# Patient Record
Sex: FEMALE | Race: WHITE | NOT HISPANIC OR LATINO | Employment: UNEMPLOYED | URBAN - METROPOLITAN AREA
[De-identification: names, ages, dates, MRNs, and addresses within clinical notes are randomized per-mention and may not be internally consistent; named-entity substitution may affect disease eponyms.]

---

## 2017-01-19 ENCOUNTER — OFFICE VISIT (OUTPATIENT)
Dept: UROLOGY | Facility: CLINIC | Age: 47
End: 2017-01-19
Payer: COMMERCIAL

## 2017-01-19 DIAGNOSIS — N39.46 MIXED INCONTINENCE: Primary | ICD-10-CM

## 2017-01-19 DIAGNOSIS — N32.81 OAB (OVERACTIVE BLADDER): ICD-10-CM

## 2017-01-19 LAB
ALBUMIN UR-MCNC: NEGATIVE MG/DL
APPEARANCE UR: CLEAR
BILIRUB UR QL STRIP: NEGATIVE
COLOR UR AUTO: YELLOW
GLUCOSE UR STRIP-MCNC: NEGATIVE MG/DL
HGB UR QL STRIP: NEGATIVE
KETONES UR STRIP-MCNC: NEGATIVE MG/DL
LEUKOCYTE ESTERASE UR QL STRIP: NEGATIVE
NITRATE UR QL: NEGATIVE
PH UR STRIP: 8.5 PH (ref 5–7)
SP GR UR STRIP: 1.02 (ref 1–1.03)
URN SPEC COLLECT METH UR: ABNORMAL
UROBILINOGEN UR STRIP-ACNC: 0.2 EU/DL (ref 0.2–1)

## 2017-01-19 PROCEDURE — 99024 POSTOP FOLLOW-UP VISIT: CPT | Performed by: UROLOGY

## 2017-01-19 PROCEDURE — 81003 URINALYSIS AUTO W/O SCOPE: CPT | Mod: QW | Performed by: UROLOGY

## 2017-01-19 PROCEDURE — 52287 CYSTOSCOPY CHEMODENERVATION: CPT | Mod: 79 | Performed by: UROLOGY

## 2017-01-19 NOTE — PROGRESS NOTES
"F/u mixed urinary incontinence, s/p Altis sling 12/12/16    Pt reports resolution of NORTH but still with urge and urge incont, fawn when she sits up in bed or \"moves any way at all.\"    Denies dysuria, gross hematuria, frequency. Noc x 1-3. Wears thick pad day and nite.      PE: Excellent support, non-tender    PVR: nil    UA:    Results for orders placed or performed in visit on 01/19/17   UA without Microscopic   Result Value Ref Range    Color Urine Yellow     Appearance Urine Clear     Glucose Urine Negative NEG mg/dL    Bilirubin Urine Negative NEG    Ketones Urine Negative NEG mg/dL    Specific Gravity Urine 1.020 1.003 - 1.035    Blood Urine Negative NEG    pH Urine 8.5 (H) 5.0 - 7.0 pH    Protein Albumin Urine Negative NEG mg/dL    Urobilinogen Urine 0.2 0.2 - 1.0 EU/dL    Nitrite Urine Negative NEG    Leukocyte Esterase Urine Negative NEG    Source Catheterized Urine      Offered pt Botox as previously discussed. Again discussed rationale, mech of action, risks, complications, recovery, results, retention risk, need for repeat therapy,etc; informed consent obtained. Urojet for 10 minutes. Then Botox A 100 units (lot [id number], exp 8/19) into 3 sites in posterior wall; excellent blebs and hemostasis.        IMP:  1. Resolution of NORTH with sling  2. Persistant OAB wet; Botox #1 with 100 units      PLAN:  1. Discussed situation with patient in detail.  2. RTC 2 wks to review progress  3. Precautions given in detail  4. Copy H Juma          "

## 2017-02-02 ENCOUNTER — OFFICE VISIT (OUTPATIENT)
Dept: UROLOGY | Facility: CLINIC | Age: 47
End: 2017-02-02
Payer: COMMERCIAL

## 2017-02-02 DIAGNOSIS — N39.46 MIXED INCONTINENCE URGE AND STRESS (MALE)(FEMALE): Primary | ICD-10-CM

## 2017-02-02 DIAGNOSIS — R30.0 DYSURIA: ICD-10-CM

## 2017-02-02 LAB
ALBUMIN UR-MCNC: ABNORMAL MG/DL
APPEARANCE UR: CLEAR
BILIRUB UR QL STRIP: NEGATIVE
COLOR UR AUTO: YELLOW
GLUCOSE UR STRIP-MCNC: NEGATIVE MG/DL
HGB UR QL STRIP: ABNORMAL
KETONES UR STRIP-MCNC: NEGATIVE MG/DL
LEUKOCYTE ESTERASE UR QL STRIP: ABNORMAL
NITRATE UR QL: POSITIVE
PH UR STRIP: 7 PH (ref 5–7)
SP GR UR STRIP: 1.02 (ref 1–1.03)
URN SPEC COLLECT METH UR: ABNORMAL
UROBILINOGEN UR STRIP-ACNC: 1 EU/DL (ref 0.2–1)

## 2017-02-02 PROCEDURE — 99024 POSTOP FOLLOW-UP VISIT: CPT | Performed by: UROLOGY

## 2017-02-02 PROCEDURE — 87088 URINE BACTERIA CULTURE: CPT | Performed by: UROLOGY

## 2017-02-02 PROCEDURE — 87186 SC STD MICRODIL/AGAR DIL: CPT | Performed by: UROLOGY

## 2017-02-02 PROCEDURE — 81003 URINALYSIS AUTO W/O SCOPE: CPT | Mod: QW | Performed by: UROLOGY

## 2017-02-02 PROCEDURE — 87086 URINE CULTURE/COLONY COUNT: CPT | Performed by: UROLOGY

## 2017-02-02 RX ORDER — SULFAMETHOXAZOLE/TRIMETHOPRIM 800-160 MG
1 TABLET ORAL 2 TIMES DAILY
Qty: 6 TABLET | Refills: 0 | Status: SHIPPED | OUTPATIENT
Start: 2017-02-02 | End: 2017-02-05

## 2017-02-02 NOTE — PROGRESS NOTES
F/u mixed incont, s/p Altis 12/16, s/p Botox #1 with 100 units 1/19/17    Control (with marked reduction in urgency) began within a couple of hours of injection. Now no leak at nite, even with movement, and no leak en route to the restroom first thing in the morning.    For the last couple of days has had some trace dysuria and a bit more urgency.      PVR: 19    Results for orders placed or performed in visit on 02/02/17   UA without Microscopic   Result Value Ref Range    Color Urine Yellow     Appearance Urine Clear     Glucose Urine Negative NEG mg/dL    Bilirubin Urine Negative NEG    Ketones Urine Negative NEG mg/dL    Specific Gravity Urine 1.020 1.003 - 1.035    Blood Urine Trace (A) NEG    pH Urine 7.0 5.0 - 7.0 pH    Protein Albumin Urine Trace (A) NEG mg/dL    Urobilinogen Urine 1.0 0.2 - 1.0 EU/dL    Nitrite Urine Positive (A) NEG    Leukocyte Esterase Urine Moderate (A) NEG    Source Midstream Urine        IMP:  1. Mixed UI, improved after first Botox  2. UTI      PLAN:  1. Discussed situation with patient in detail.  2. UC; emperic Bactrim  3. RTC 2.5 mos; review progress; may want to add additional Botox at that point; discussed in detail  4. 15 minutes spent with patient, more than 50% spent in counseling and coordination of care for incont and UTI.  5. Copy H Dennison

## 2017-02-05 LAB
BACTERIA SPEC CULT: ABNORMAL
Lab: ABNORMAL
MICRO REPORT STATUS: ABNORMAL
MICROORGANISM SPEC CULT: ABNORMAL
MICROORGANISM SPEC CULT: ABNORMAL
SPECIMEN SOURCE: ABNORMAL

## 2017-02-06 ENCOUNTER — TELEPHONE (OUTPATIENT)
Dept: UROLOGY | Facility: CLINIC | Age: 47
End: 2017-02-06

## 2017-02-06 NOTE — TELEPHONE ENCOUNTER
Called pt and informed her of positive UC> 50,000 to 100,000 E-coli and Strain 2 E-coli > 10-50,000, both sensitive to Bactrim .Pt stated she felt better. Pt has f/u appt 5/17 or sooner if needed.

## 2017-02-16 ENCOUNTER — OFFICE VISIT (OUTPATIENT)
Dept: UROLOGY | Facility: CLINIC | Age: 47
End: 2017-02-16
Payer: COMMERCIAL

## 2017-02-16 VITALS — SYSTOLIC BLOOD PRESSURE: 110 MMHG | DIASTOLIC BLOOD PRESSURE: 76 MMHG

## 2017-02-16 DIAGNOSIS — R30.0 DYSURIA: Primary | ICD-10-CM

## 2017-02-16 LAB
ALBUMIN UR-MCNC: NEGATIVE MG/DL
APPEARANCE UR: CLEAR
BILIRUB UR QL STRIP: NEGATIVE
COLOR UR AUTO: YELLOW
GLUCOSE UR STRIP-MCNC: NEGATIVE MG/DL
HGB UR QL STRIP: ABNORMAL
KETONES UR STRIP-MCNC: NEGATIVE MG/DL
LEUKOCYTE ESTERASE UR QL STRIP: ABNORMAL
NITRATE UR QL: NEGATIVE
PH UR STRIP: 7 PH (ref 5–7)
SP GR UR STRIP: 1.01 (ref 1–1.03)
URN SPEC COLLECT METH UR: ABNORMAL
UROBILINOGEN UR STRIP-ACNC: 0.2 EU/DL (ref 0.2–1)

## 2017-02-16 PROCEDURE — 87086 URINE CULTURE/COLONY COUNT: CPT | Performed by: UROLOGY

## 2017-02-16 PROCEDURE — 99024 POSTOP FOLLOW-UP VISIT: CPT | Performed by: UROLOGY

## 2017-02-16 PROCEDURE — 87088 URINE BACTERIA CULTURE: CPT | Performed by: UROLOGY

## 2017-02-16 PROCEDURE — 87186 SC STD MICRODIL/AGAR DIL: CPT | Performed by: UROLOGY

## 2017-02-16 PROCEDURE — 81003 URINALYSIS AUTO W/O SCOPE: CPT | Mod: QW | Performed by: UROLOGY

## 2017-02-16 NOTE — PROGRESS NOTES
"Here for possible UTI      Underwent last Botox on 1/19/17    Seen 2/2/17, found to have E Coli UTI with 2 strains.    Now reports 2 d of \"some burning;\" seems to be better today; \"I almost called to cancel the appointment.\"    BM's are fine. Otherwise feels well.      Results for orders placed or performed in visit on 02/16/17   UA without Microscopic   Result Value Ref Range    Color Urine Yellow     Appearance Urine Clear     Glucose Urine Negative NEG mg/dL    Bilirubin Urine Negative NEG    Ketones Urine Negative NEG mg/dL    Specific Gravity Urine 1.015 1.003 - 1.035    Blood Urine Moderate (A) NEG    pH Urine 7.0 5.0 - 7.0 pH    Protein Albumin Urine Negative NEG mg/dL    Urobilinogen Urine 0.2 0.2 - 1.0 EU/dL    Nitrite Urine Negative NEG    Leukocyte Esterase Urine Moderate (A) NEG    Source Midstream Urine        IMP:  1. Unlikely to be UTI      PLAN:  1. UC; no emperic rx  2. RTC as scheduled  3. 15 minutes spent with patient, more than 50% spent in counseling and coordination of care for OAB/UTI.  4. Copy H Dennison  "

## 2017-02-16 NOTE — MR AVS SNAPSHOT
"              After Visit Summary   2/16/2017    Marsha Charles    MRN: [id number]           Patient Information     Date Of Birth          1970        Visit Information        Provider Department      2/16/2017 1:15 PM Luis Hanson MD [facility]        Today's Diagnoses     Dysuria    -  1       Follow-ups after your visit        Your next 10 appointments already scheduled     May 01, 2017 11:30 AM CDT   Return Visit with Luis Hanson MD   [facility] ([facility])    [address]  [facility] [id number] [phone]              Who to contact     If you have questions or need follow up information about today's clinic visit or your schedule please contact [facility] directly at [phone].  Normal or non-critical lab and imaging results will be communicated to you by MyChart, letter or phone within 4 business days after the clinic has received the results. If you do not hear from us within 7 days, please contact the clinic through MyChart or phone. If you have a critical or abnormal lab result, we will notify you by phone as soon as possible.  Submit refill requests through [employer] or call your pharmacy and they will forward the refill request to us. Please allow 3 business days for your refill to be completed.          Additional Information About Your Visit        MyChart Information     [employer] lets you send messages to your doctor, view your test results, renew your prescriptions, schedule appointments and more. To sign up, go to [email or web address] . Click on \"Log in\" on the left side of the screen, which will take you to the Welcome page. Then click on \"Sign up Now\" on the right side of the page.     You will be asked to enter the access code listed below, as well as some personal information. Please follow the " directions to create your username and password.     Your access code is: [email or web address]  Expires: 2017  1:30 PM     Your access code will  in 90 days. If you need help or a new code, please call your [place] clinic or [phone].        Care EveryWhere ID     This is your Care EveryWhere ID. This could be used by other organizations to access your [place] medical records  [id number]         Blood Pressure from Last 3 Encounters:   17 110/76   16 123/72    Weight from Last 3 Encounters:   16 190 lb (86.2 kg)              We Performed the Following     UA without Microscopic        Primary Care Provider Office Phone # Fax #    Halina Dennison [phone]       [employer] [place] [address] [facility] [address]        Thank you!     Thank you for choosing [facility] FOR [facility]OL [facility]  for your care. Our goal is always to provide you with excellent care. Hearing back from our patients is one way we can continue to improve our services. Please take a few minutes to complete the written survey that you may receive in the mail after your visit with us. Thank you!             Your Updated Medication List - Protect others around you: Learn how to safely use, store and throw away your medicines at www.disposemymeds.org.          This list is accurate as of: 17  1:30 PM.  Always use your most recent med list.                   Brand Name Dispense Instructions for use    albuterol 108 (90 BASE) MCG/ACT Inhaler    PROAIR HFA/PROVENTIL HFA/VENTOLIN HFA     Inhale 2 puffs into the lungs every 4 hours as needed for shortness of breath / dyspnea or wheezing       ATORVASTATIN CALCIUM PO      Take 40 mg by mouth daily       budesonide-formoterol 80-4.5 MCG/ACT Inhaler    SYMBICORT     Inhale 2 puffs into the lungs daily as needed       CHANTIX PO      Take 1 mg by mouth       GABAPENTIN PO      Take 600 mg by mouth 3 times daily       METFORMIN HCL PO       Take 1,000 mg by mouth       SUDAFED PO      Take 30 mg by mouth every 4 hours as needed for congestion       TYLENOL PO      Take 1,000 mg by mouth every 6 hours as needed for mild pain or fever

## 2017-02-18 LAB
BACTERIA SPEC CULT: ABNORMAL
Lab: ABNORMAL
MICRO REPORT STATUS: ABNORMAL
MICROORGANISM SPEC CULT: ABNORMAL
SPECIMEN SOURCE: ABNORMAL

## 2017-02-19 ENCOUNTER — TELEPHONE (OUTPATIENT)
Dept: UROLOGY | Facility: CLINIC | Age: 47
End: 2017-02-19

## 2017-02-19 DIAGNOSIS — R30.0 DYSURIA: Primary | ICD-10-CM

## 2017-02-19 RX ORDER — SULFAMETHOXAZOLE/TRIMETHOPRIM 800-160 MG
1 TABLET ORAL 2 TIMES DAILY
Qty: 6 TABLET | Refills: 0 | Status: SHIPPED | OUTPATIENT
Start: 2017-02-19 | End: 2017-02-22

## 2017-03-07 ENCOUNTER — TELEPHONE (OUTPATIENT)
Dept: UROLOGY | Facility: CLINIC | Age: 47
End: 2017-03-07

## 2017-03-07 NOTE — TELEPHONE ENCOUNTER
Pt called and thinks she has another botox, same symptoms as previous visits, burning, frequency.  Pt is now concerned because has has has to positive UC's since botox was done 1/19/17, she is wondering if she should've been given more antibiotics that would last more than 3 days.  Pt upset because she was not given a antibiotic after botox injection in January.  Please advise as to what pt should do at this point? Should she come back again for UC? Will you give her more antibiotics?

## 2017-03-08 ENCOUNTER — OFFICE VISIT (OUTPATIENT)
Dept: UROLOGY | Facility: CLINIC | Age: 47
End: 2017-03-08
Payer: COMMERCIAL

## 2017-03-08 DIAGNOSIS — R30.0 DYSURIA: Primary | ICD-10-CM

## 2017-03-08 LAB
ALBUMIN UR-MCNC: 30 MG/DL
APPEARANCE UR: ABNORMAL
BILIRUB UR QL STRIP: NEGATIVE
COLOR UR AUTO: YELLOW
GLUCOSE UR STRIP-MCNC: NEGATIVE MG/DL
HGB UR QL STRIP: ABNORMAL
KETONES UR STRIP-MCNC: ABNORMAL MG/DL
LEUKOCYTE ESTERASE UR QL STRIP: ABNORMAL
NITRATE UR QL: POSITIVE
PH UR STRIP: 7 PH (ref 5–7)
SP GR UR STRIP: 1.01 (ref 1–1.03)
URN SPEC COLLECT METH UR: ABNORMAL
UROBILINOGEN UR STRIP-ACNC: 1 EU/DL (ref 0.2–1)

## 2017-03-08 PROCEDURE — 99024 POSTOP FOLLOW-UP VISIT: CPT | Performed by: UROLOGY

## 2017-03-08 PROCEDURE — 87086 URINE CULTURE/COLONY COUNT: CPT | Performed by: UROLOGY

## 2017-03-08 PROCEDURE — 87186 SC STD MICRODIL/AGAR DIL: CPT | Performed by: UROLOGY

## 2017-03-08 PROCEDURE — 81003 URINALYSIS AUTO W/O SCOPE: CPT | Mod: QW | Performed by: UROLOGY

## 2017-03-08 PROCEDURE — 87088 URINE BACTERIA CULTURE: CPT | Performed by: UROLOGY

## 2017-03-08 RX ORDER — NITROFURANTOIN 25; 75 MG/1; MG/1
100 CAPSULE ORAL 2 TIMES DAILY
Qty: 10 CAPSULE | Refills: 0 | Status: SHIPPED | OUTPATIENT
Start: 2017-03-08 | End: 2017-03-13

## 2017-03-08 NOTE — PROGRESS NOTES
"Here for f/u UTI    S/p Altis sling 12/12/16 and Botox 1/19/17    Now with intermittent dysuria, slight today. Nocturia x 4 last nite but only x 1 the prior 2 nites.     Wearing a light pad, essentially dry during the days but wet last nite.    Otherwise feels well.    Has \"borderline\" DM, on Metformin, has not checked her sugar in \"a long time.\"      Reviewed urines:  12/12/16 Sling  1/19/17 Botox; UA neg  2/2/17  UC: E coli  2/16/17 UC: E coli    Today:  Results for orders placed or performed in visit on 03/08/17   UA without Microscopic   Result Value Ref Range    Color Urine Yellow     Appearance Urine Cloudy     Glucose Urine Negative NEG mg/dL    Bilirubin Urine Negative NEG    Ketones Urine Trace (A) NEG mg/dL    Specific Gravity Urine 1.015 1.003 - 1.035    Blood Urine Trace (A) NEG    pH Urine 7.0 5.0 - 7.0 pH    Protein Albumin Urine 30 (A) NEG mg/dL    Urobilinogen Urine 1.0 0.2 - 1.0 EU/dL    Nitrite Urine Positive (A) NEG    Leukocyte Esterase Urine Moderate (A) NEG    Source Midstream Urine        IMP:  1. UTI, recurrent vs persistent  2. DM with questionable control      PLAN:  1. Discussed situation with patient in detail.  2. UC; emperic rx with Macrobid (per prior sensitivities)  3. Stressed importance of sugar control on infection control; pt will check her sugar today  4. RTC 1 mo to review progress, perform test of cure  5. 15 minutes spent with patient, more than 50% spent in counseling and coordination of care for UTI.  6. Copy H Juma  "

## 2017-03-08 NOTE — MR AVS SNAPSHOT
"              After Visit Summary   3/8/2017    Marsha Charles    MRN: [id number]           Patient Information     Date Of Birth          1970        Visit Information        Provider Department      3/8/2017 9:00 AM Luis Hanson MD [facility]        Today's Diagnoses     Dysuria    -  1       Follow-ups after your visit        Follow-up notes from your care team     Return in about 1 month (around 4/8/2017).      Your next 10 appointments already scheduled     May 01, 2017 11:30 AM CDT   Return Visit with Luis Hanson MD   [facility] ([facility])    [address] [place] [address] [place] [address] [phone]              Who to contact     If you have questions or need follow up information about today's clinic visit or your schedule please contact [facility] directly at [phone].  Normal or non-critical lab and imaging results will be communicated to you by "[email or web address]hart, letter or phone within 4 business days after the clinic has received the results. If you do not hear from us within 7 days, please contact the clinic through "[email or web address]hart or phone. If you have a critical or abnormal lab result, we will notify you by phone as soon as possible.  Submit refill requests through [employer] or call your pharmacy and they will forward the refill request to us. Please allow 3 business days for your refill to be completed.          Additional Information About Your Visit        MyChart Information     [employer] lets you send messages to your doctor, view your test results, renew your prescriptions, schedule appointments and more. To sign up, go to [email or web address] . Click on \"Log in\" on the left side of the screen, which will take you to the Welcome page. Then click on \"Sign up Now\" on the right side of the page.     You will be asked to enter " the access code listed below, as well as some personal information. Please follow the directions to create your username and password.     Your access code is: [email or web address]  Expires: 2017  1:30 PM     Your access code will  in 90 days. If you need help or a new code, please call your [place] clinic or [phone].        Care EveryWhere ID     This is your Care EveryWhere ID. This could be used by other organizations to access your [place] medical records  [id number]         Blood Pressure from Last 3 Encounters:   17 110/76   16 123/72    Weight from Last 3 Encounters:   16 190 lb (86.2 kg)              We Performed the Following     UA without Microscopic     Urine Culture Aerobic Bacterial          Today's Medication Changes          These changes are accurate as of: 3/8/17  9:55 AM.  If you have any questions, ask your nurse or doctor.               Start taking these medicines.        Dose/Directions    nitrofurantoin (macrocrystal-monohydrate) 100 MG capsule   Commonly known as:  MACROBID   Used for:  Dysuria        Dose:  100 mg   Take 1 capsule (100 mg) by mouth 2 times daily for 10 doses   Quantity:  10 capsule   Refills:  0            Where to get your medicines      These medications were sent to [facility] [address] [place] [address] [id number] [place], [place] [id number]     Phone:  [phone]     nitrofurantoin (macrocrystal-monohydrate) 100 MG capsule                Primary Care Provider Office Phone # Fax #    Halina Dennison [phone]       [facility] [address] [facility] [address]        Thank you!     Thank you for choosing [facility]  for your care. Our goal is always to provide you with excellent care. Hearing back from our patients is one way we can continue to improve our services. Please take a few minutes to complete the written survey that you may receive in the mail  after your visit with us. Thank you!             Your Updated Medication List - Protect others around you: Learn how to safely use, store and throw away your medicines at www.disposemymeds.org.          This list is accurate as of: 3/8/17  9:55 AM.  Always use your most recent med list.                   Brand Name Dispense Instructions for use    albuterol 108 (90 BASE) MCG/ACT Inhaler    PROAIR HFA/PROVENTIL HFA/VENTOLIN HFA     Inhale 2 puffs into the lungs every 4 hours as needed for shortness of breath / dyspnea or wheezing       ATORVASTATIN CALCIUM PO      Take 40 mg by mouth daily       budesonide-formoterol 80-4.5 MCG/ACT Inhaler    SYMBICORT     Inhale 2 puffs into the lungs daily as needed       CHANTIX PO      Take 1 mg by mouth       GABAPENTIN PO      Take 600 mg by mouth 3 times daily       METFORMIN HCL PO      Take 1,000 mg by mouth       nitrofurantoin (macrocrystal-monohydrate) 100 MG capsule    MACROBID    10 capsule    Take 1 capsule (100 mg) by mouth 2 times daily for 10 doses       SUDAFED PO      Take 30 mg by mouth every 4 hours as needed for congestion       TYLENOL PO      Take 1,000 mg by mouth every 6 hours as needed for mild pain or fever

## 2017-03-13 ENCOUNTER — TELEPHONE (OUTPATIENT)
Dept: UROLOGY | Facility: CLINIC | Age: 47
End: 2017-03-13

## 2017-03-13 NOTE — TELEPHONE ENCOUNTER
Called pt and informed her of positive UC>,000 colonies E-Coli and that she had been put on the correct mediation, Macrobid. Pt RTC 4/5/17 for CHRISTINA.

## 2017-03-15 ENCOUNTER — OFFICE VISIT (OUTPATIENT)
Dept: UROLOGY | Facility: CLINIC | Age: 47
End: 2017-03-15
Payer: COMMERCIAL

## 2017-03-15 DIAGNOSIS — R30.0 DYSURIA: Primary | ICD-10-CM

## 2017-03-15 LAB
ALBUMIN UR-MCNC: NEGATIVE MG/DL
APPEARANCE UR: CLEAR
BILIRUB UR QL STRIP: NEGATIVE
COLOR UR AUTO: YELLOW
GLUCOSE UR STRIP-MCNC: NEGATIVE MG/DL
HGB UR QL STRIP: NEGATIVE
KETONES UR STRIP-MCNC: NEGATIVE MG/DL
LEUKOCYTE ESTERASE UR QL STRIP: NEGATIVE
NITRATE UR QL: NEGATIVE
PH UR STRIP: 8.5 PH (ref 5–7)
SP GR UR STRIP: 1.01 (ref 1–1.03)
URN SPEC COLLECT METH UR: ABNORMAL
UROBILINOGEN UR STRIP-ACNC: 1 EU/DL (ref 0.2–1)

## 2017-03-15 PROCEDURE — 87086 URINE CULTURE/COLONY COUNT: CPT | Performed by: UROLOGY

## 2017-03-15 PROCEDURE — 99213 OFFICE O/P EST LOW 20 MIN: CPT | Performed by: UROLOGY

## 2017-03-15 PROCEDURE — 81003 URINALYSIS AUTO W/O SCOPE: CPT | Mod: QW | Performed by: UROLOGY

## 2017-03-15 NOTE — PROGRESS NOTES
Here for possible UTI.    S/p Altis sling 12/16 and Botox #1 on 1/17    Took all of her Macrobid.    Yesterday noted some urgency, perhaps some trace dysuria.    Sugars are better, around 150.        Results for orders placed or performed in visit on 03/15/17   UA without Microscopic   Result Value Ref Range    Color Urine Yellow     Appearance Urine Clear     Glucose Urine Negative NEG mg/dL    Bilirubin Urine Negative NEG    Ketones Urine Negative NEG mg/dL    Specific Gravity Urine 1.015 1.003 - 1.035    Blood Urine Negative NEG    pH Urine 8.5 (H) 5.0 - 7.0 pH    Protein Albumin Urine Negative NEG mg/dL    Urobilinogen Urine 1.0 0.2 - 1.0 EU/dL    Nitrite Urine Negative NEG    Leukocyte Esterase Urine Negative NEG    Source Midstream Urine        IMP:  1. Urinary urgency  2. Recent persistent UTI, apparently resolved  3. DM, under better control      PLAN:  1. Discussed situation with patient in detail.  2. UC; no emperic abx  3. Continue to watch sugars  4. RTC in early April as scheduled (or sooner prn)  5. 15 minutes spent with patient, more than 50% spent in counseling and coordination of care for urgency/UTI.  6. Copy H Juma

## 2017-03-15 NOTE — MR AVS SNAPSHOT
"              After Visit Summary   3/15/2017    Marsha Charles    MRN: [id number]           Patient Information     Date Of Birth          1970        Visit Information        Provider Department      3/15/2017 10:30 AM Luis Hanson MD [facility]        Today's Diagnoses     Dysuria    -  1       Follow-ups after your visit        Your next 10 appointments already scheduled     Apr 05, 2017  8:45 AM CDT   Return Visit with Luis Hanson MD   [facility] ([facility] [place])    [address]  [facility] [id number] [phone]              Who to contact     If you have questions or need follow up information about today's clinic visit or your schedule please contact [facility] directly at [phone].  Normal or non-critical lab and imaging results will be communicated to you by MyChart, letter or phone within 4 business days after the clinic has received the results. If you do not hear from us within 7 days, please contact the clinic through MyChart or phone. If you have a critical or abnormal lab result, we will notify you by phone as soon as possible.  Submit refill requests through [employer] or call your pharmacy and they will forward the refill request to us. Please allow 3 business days for your refill to be completed.          Additional Information About Your Visit        MyChart Information     [employer] lets you send messages to your doctor, view your test results, renew your prescriptions, schedule appointments and more. To sign up, go to [email or web address] . Click on \"Log in\" on the left side of the screen, which will take you to the Welcome page. Then click on \"Sign up Now\" on the right side of the page.     You will be asked to enter the access code listed below, as well as some personal information. Please follow " the directions to create your username and password.     Your access code is: [email or web address]  Expires: 2017  2:30 PM     Your access code will  in 90 days. If you need help or a new code, please call your [place] clinic or [phone].        Care EveryWhere ID     This is your Care EveryWhere ID. This could be used by other organizations to access your [place] medical records  [id number]         Blood Pressure from Last 3 Encounters:   17 110/76   16 123/72    Weight from Last 3 Encounters:   16 190 lb (86.2 kg)              We Performed the Following     UA without Microscopic     Urine Culture Aerobic Bacterial        Primary Care Provider Office Phone # Fax #    Halina Dennison [phone]       [employer] [address] [facility] [address]        Thank you!     Thank you for choosing [facility]  for your care. Our goal is always to provide you with excellent care. Hearing back from our patients is one way we can continue to improve our services. Please take a few minutes to complete the written survey that you may receive in the mail after your visit with us. Thank you!             Your Updated Medication List - Protect others around you: Learn how to safely use, store and throw away your medicines at www.disposemymeds.org.          This list is accurate as of: 3/15/17 10:52 AM.  Always use your most recent med list.                   Brand Name Dispense Instructions for use    albuterol 108 (90 BASE) MCG/ACT Inhaler    PROAIR HFA/PROVENTIL HFA/VENTOLIN HFA     Inhale 2 puffs into the lungs every 4 hours as needed for shortness of breath / dyspnea or wheezing       ATORVASTATIN CALCIUM PO      Take 40 mg by mouth daily       budesonide-formoterol 80-4.5 MCG/ACT Inhaler    SYMBICORT     Inhale 2 puffs into the lungs daily as needed       CHANTIX PO      Take 1 mg by mouth       GABAPENTIN PO      Take 600 mg by mouth  3 times daily       METFORMIN HCL PO      Take 1,000 mg by mouth       SUDAFED PO      Take 30 mg by mouth every 4 hours as needed for congestion       TYLENOL PO      Take 1,000 mg by mouth every 6 hours as needed for mild pain or fever

## 2017-03-16 LAB
BACTERIA SPEC CULT: NORMAL
Lab: NORMAL
MICRO REPORT STATUS: NORMAL
SPECIMEN SOURCE: NORMAL

## 2017-03-20 ENCOUNTER — TELEPHONE (OUTPATIENT)
Dept: UROLOGY | Facility: CLINIC | Age: 47
End: 2017-03-20

## 2017-03-20 NOTE — TELEPHONE ENCOUNTER
Called pt and informed her of Neg UC. States she feels as if she still does have something going on. Pt has f/u appt 4/5/17. RTC sooner if not improved

## 2017-04-05 ENCOUNTER — OFFICE VISIT (OUTPATIENT)
Dept: UROLOGY | Facility: CLINIC | Age: 47
End: 2017-04-05
Payer: COMMERCIAL

## 2017-04-05 DIAGNOSIS — R30.0 DYSURIA: Primary | ICD-10-CM

## 2017-04-05 LAB
ALBUMIN UR-MCNC: 30 MG/DL
APPEARANCE UR: ABNORMAL
BILIRUB UR QL STRIP: NEGATIVE
COLOR UR AUTO: YELLOW
GLUCOSE UR STRIP-MCNC: NEGATIVE MG/DL
HGB UR QL STRIP: NEGATIVE
KETONES UR STRIP-MCNC: 15 MG/DL
LEUKOCYTE ESTERASE UR QL STRIP: ABNORMAL
NITRATE UR QL: POSITIVE
PH UR STRIP: 7 PH (ref 5–7)
SP GR UR STRIP: 1.01 (ref 1–1.03)
URN SPEC COLLECT METH UR: ABNORMAL
UROBILINOGEN UR STRIP-ACNC: 1 EU/DL (ref 0.2–1)

## 2017-04-05 PROCEDURE — 81003 URINALYSIS AUTO W/O SCOPE: CPT | Mod: QW | Performed by: UROLOGY

## 2017-04-05 PROCEDURE — 99214 OFFICE O/P EST MOD 30 MIN: CPT | Performed by: UROLOGY

## 2017-04-05 NOTE — MR AVS SNAPSHOT
"              After Visit Summary   4/5/2017    Marsha Charles    MRN: [id number]           Patient Information     Date Of Birth          1970        Visit Information        Provider Department      4/5/2017 8:30 AM Luis Hanson MD [facility]        Today's Diagnoses     Dysuria    -  1       Follow-ups after your visit        Follow-up notes from your care team     Return in about 3 months (around 7/5/2017).      Your next 10 appointments already scheduled     Jul 10, 2017 11:30 AM CDT   Return Visit with Luis Hanson MD   [facility] ([facility] [place])    [address] [facility] [address] [place] [id number] [phone]              Who to contact     If you have questions or need follow up information about today's clinic visit or your schedule please contact [facility] BLAD[facility] CONTR[facility] directly at [phone].  Normal or non-critical lab and imaging results will be communicated to you by Live Matrixhart, letter or phone within 4 business days after the clinic has received the results. If you do not hear from us within 7 days, please contact the clinic through Live Matrixhart or phone. If you have a critical or abnormal lab result, we will notify you by phone as soon as possible.  Submit refill requests through Chunyu or call your pharmacy and they will forward the refill request to us. Please allow 3 business days for your refill to be completed.          Additional Information About Your Visit        MyChart Information     Chunyu lets you send messages to your doctor, view your test results, renew your prescriptions, schedule appointments and more. To sign up, go to [email or web address] . Click on \"Log in\" on the left side of the screen, which will take you to the Welcome page. Then click on \"Sign up Now\" on the right side of the page.     You will be asked to " enter the access code listed below, as well as some personal information. Please follow the directions to create your username and password.     Your access code is: [email or web address]  Expires: 2017  2:30 PM     Your access code will  in 90 days. If you need help or a new code, please call your [facility] clinic or [phone].        Care EveryWhere ID     This is your Care EveryWhere ID. This could be used by other organizations to access your [facility] medical records  [id number]         Blood Pressure from Last 3 Encounters:   17 110/76   16 123/72    Weight from Last 3 Encounters:   16 190 lb (86.2 kg)              We Performed the Following     UA without Microscopic        Primary Care Provider Office Phone # Fax #    Halina Dennison [phone]       [employer] [address] [facility] [address]        Thank you!     Thank you for choosing [facility]  for your care. Our goal is always to provide you with excellent care. Hearing back from our patients is one way we can continue to improve our services. Please take a few minutes to complete the written survey that you may receive in the mail after your visit with us. Thank you!             Your Updated Medication List - Protect others around you: Learn how to safely use, store and throw away your medicines at www.disposemymeds.org.          This list is accurate as of: 17  9:24 AM.  Always use your most recent med list.                   Brand Name Dispense Instructions for use    albuterol 108 (90 BASE) MCG/ACT Inhaler    PROAIR HFA/PROVENTIL HFA/VENTOLIN HFA     Inhale 2 puffs into the lungs every 4 hours as needed for shortness of breath / dyspnea or wheezing       ATORVASTATIN CALCIUM PO      Take 40 mg by mouth daily       budesonide-formoterol 80-4.5 MCG/ACT Inhaler    SYMBICORT     Inhale 2 puffs into the lungs daily as needed       CHANTIX PO      Take 1 mg by  mouth       GABAPENTIN PO      Take 600 mg by mouth 3 times daily       METFORMIN HCL PO      Take 1,000 mg by mouth       SUDAFED PO      Take 30 mg by mouth every 4 hours as needed for congestion       TYLENOL PO      Take 1,000 mg by mouth every 6 hours as needed for mild pain or fever

## 2017-04-05 NOTE — PROGRESS NOTES
"F/u incont and UTI's, s/p Altis 12/16 and Botox #1 on 1/17, neg UC on 3/15/17    \"I'm doing really, really well.\"    Wears (security) pad, one for 24 hrs, basically dry. Can now run in her backyard and \"no leak; and it was that leak that really took me out of the action.\"    Denies dysuria, gross hematuria, frequency; noc x 2. No systemic toxicity.     Has not been checking sugars.      Current Outpatient Prescriptions   Medication     Acetaminophen (TYLENOL PO)     Pseudoephedrine HCl (SUDAFED PO)     albuterol (PROAIR HFA/PROVENTIL HFA/VENTOLIN HFA) 108 (90 BASE) MCG/ACT Inhaler     budesonide-formoterol (SYMBICORT) 80-4.5 MCG/ACT Inhaler     GABAPENTIN PO     METFORMIN HCL PO     Varenicline Tartrate (CHANTIX PO)     ATORVASTATIN CALCIUM PO     No current facility-administered medications for this visit.        Results for orders placed or performed in visit on 04/05/17   UA without Microscopic   Result Value Ref Range    Color Urine Yellow     Appearance Urine Cloudy     Glucose Urine Negative NEG mg/dL    Bilirubin Urine Negative NEG    Ketones Urine 15 (A) NEG mg/dL    Specific Gravity Urine 1.015 1.003 - 1.035    Blood Urine Negative NEG    pH Urine 7.0 5.0 - 7.0 pH    Protein Albumin Urine 30 (A) NEG mg/dL    Urobilinogen Urine 1.0 0.2 - 1.0 EU/dL    Nitrite Urine Positive (A) NEG    Leukocyte Esterase Urine Moderate (A) NEG    Source Midstream Urine          IMP:  1. Resolution of NORTH with sling and OAB wet with Botox  2. DM; not checking sugars  3. Assymptomatic bactiuria      PLAN:  1. Discussed situation with patient in detail.  2. Offerec pt abx vs conservative rx; pt elects later  3. Stressed importance of sugar control; she'll start checking  4. RTC around July for next Botox (or sooner prn)  5. 25 minutes spent with patient, more than 50% spent in counseling and coordination of care for OAB/NORTH/UTI.  6.  H Juma  "

## 2017-05-11 ENCOUNTER — HOSPITAL ENCOUNTER (EMERGENCY)
Facility: CLINIC | Age: 47
Discharge: HOME | End: 2017-05-11
Attending: EMERGENCY MEDICINE | Admitting: EMERGENCY MEDICINE
Payer: COMMERCIAL

## 2017-05-11 VITALS
HEART RATE: 92 BPM | OXYGEN SATURATION: 95 % | RESPIRATION RATE: 18 BRPM | SYSTOLIC BLOOD PRESSURE: 131 MMHG | DIASTOLIC BLOOD PRESSURE: 88 MMHG | TEMPERATURE: 98.3 F

## 2017-05-11 DIAGNOSIS — F22 PARANOIA (H): ICD-10-CM

## 2017-05-11 LAB
ALBUMIN SERPL-MCNC: 4 G/DL (ref 3.4–5)
ALP SERPL-CCNC: 100 U/L (ref 40–150)
ALT SERPL W P-5'-P-CCNC: 22 U/L (ref 0–50)
AMPHETAMINES UR QL SCN: NORMAL
ANION GAP SERPL CALCULATED.3IONS-SCNC: 5 MMOL/L (ref 3–14)
AST SERPL W P-5'-P-CCNC: 13 U/L (ref 0–45)
BARBITURATES UR QL: NORMAL
BENZODIAZ UR QL: NORMAL
BILIRUB SERPL-MCNC: 0.3 MG/DL (ref 0.2–1.3)
BUN SERPL-MCNC: 6 MG/DL (ref 7–30)
CALCIUM SERPL-MCNC: 9.4 MG/DL (ref 8.5–10.1)
CANNABINOIDS UR QL SCN: NORMAL
CHLORIDE SERPL-SCNC: 107 MMOL/L (ref 94–109)
CO2 SERPL-SCNC: 29 MMOL/L (ref 20–32)
COCAINE UR QL: NORMAL
CREAT SERPL-MCNC: 0.7 MG/DL (ref 0.52–1.04)
ERYTHROCYTE [DISTWIDTH] IN BLOOD BY AUTOMATED COUNT: 12.5 % (ref 10–15)
GFR SERPL CREATININE-BSD FRML MDRD: 89 ML/MIN/1.7M2
GLUCOSE SERPL-MCNC: 120 MG/DL (ref 70–99)
HCT VFR BLD AUTO: 42.5 % (ref 35–47)
HGB BLD-MCNC: 14.3 G/DL (ref 11.7–15.7)
MCH RBC QN AUTO: 33.5 PG (ref 26.5–33)
MCHC RBC AUTO-ENTMCNC: 33.6 G/DL (ref 31.5–36.5)
MCV RBC AUTO: 100 FL (ref 78–100)
OPIATES UR QL SCN: NORMAL
PCP UR QL SCN: NORMAL
PLATELET # BLD AUTO: 256 10E9/L (ref 150–450)
POTASSIUM SERPL-SCNC: 4.3 MMOL/L (ref 3.4–5.3)
PROT SERPL-MCNC: 7.6 G/DL (ref 6.8–8.8)
RBC # BLD AUTO: 4.27 10E12/L (ref 3.8–5.2)
SODIUM SERPL-SCNC: 141 MMOL/L (ref 133–144)
T4 FREE SERPL-MCNC: 1.17 NG/DL (ref 0.76–1.46)
TSH SERPL DL<=0.005 MIU/L-ACNC: 4.77 MU/L (ref 0.4–4)
WBC # BLD AUTO: 7.4 10E9/L (ref 4–11)

## 2017-05-11 PROCEDURE — 85027 COMPLETE CBC AUTOMATED: CPT | Performed by: EMERGENCY MEDICINE

## 2017-05-11 PROCEDURE — 84439 ASSAY OF FREE THYROXINE: CPT | Performed by: EMERGENCY MEDICINE

## 2017-05-11 PROCEDURE — 90791 PSYCH DIAGNOSTIC EVALUATION: CPT

## 2017-05-11 PROCEDURE — 36415 COLL VENOUS BLD VENIPUNCTURE: CPT | Performed by: EMERGENCY MEDICINE

## 2017-05-11 PROCEDURE — 84443 ASSAY THYROID STIM HORMONE: CPT | Performed by: EMERGENCY MEDICINE

## 2017-05-11 PROCEDURE — 80053 COMPREHEN METABOLIC PANEL: CPT | Performed by: EMERGENCY MEDICINE

## 2017-05-11 PROCEDURE — 80307 DRUG TEST PRSMV CHEM ANLYZR: CPT | Performed by: EMERGENCY MEDICINE

## 2017-05-11 PROCEDURE — 99285 EMERGENCY DEPT VISIT HI MDM: CPT | Mod: 25

## 2017-05-11 ASSESSMENT — ENCOUNTER SYMPTOMS
HEADACHES: 1
DIARRHEA: 0
UNEXPECTED WEIGHT CHANGE: 0

## 2017-05-11 NOTE — ED AVS SNAPSHOT
[facility] Emergency Department    [address]    [facility] [address]    Phone:  [phone]    Fax:  [phone]                                       Marsha Charles   MRN: [id number]    Department:  [facility] Emergency Department   Date of Visit:  5/11/2017           After Visit Summary Signature Page     I have received my discharge instructions, and my questions have been answered. I have discussed any challenges I see with this plan with the nurse or doctor.    ..........................................................................................................................................  Patient/Patient Representative Signature      ..........................................................................................................................................  Patient Representative Print Name and Relationship to Patient    ..................................................               ................................................  Date                                            Time    ..........................................................................................................................................  Reviewed by Signature/Title    ...................................................              ..............................................  Date                                                            Time

## 2017-05-11 NOTE — ED AVS SNAPSHOT
[facility] Emergency Department    [address]    [facility] [address]    Phone:  [phone]    Fax:  [phone]                                       Marsha Charles   MRN: [id number]    Department:  [facility] Emergency [facility]   Date of Visit:  5/11/2017           Patient Information     Date Of Birth          1970        Your diagnoses for this visit were:     Paranoia (H)        You were seen by Ida Joel MD.      Follow-up Information     Follow up with [facility].    Specialty:  EMERGENCY MEDICINE    Why:  If symptoms worsen    Contact information:    [address]  [facility] [id number] [phone]        Follow up with Per your insurance.        Discharge Instructions       Return immediately for any worsening    Future Appointments        Provider Department Dept Phone Center    7/10/2017 11:30 AM Luis Hanson MD [facility] [place] [phone] [facility]      24 Hour Appointment Hotline       To make an appointment at any [facility] clinic, call [phone] ([phone]). If you don't have a family doctor or clinic, we will help you find one. [facility] clinics are conveniently located to serve the needs of you and your family.             Review of your medicines      Our records show that you are taking the medicines listed below. If these are incorrect, please call your family doctor or clinic.        Dose / Directions Last dose taken    albuterol 108 (90 BASE) MCG/ACT Inhaler   Commonly known as:  PROAIR HFA/PROVENTIL HFA/VENTOLIN HFA   Dose:  2 puff        Inhale 2 puffs into the lungs every 4 hours as needed for shortness of breath / dyspnea or wheezing   Refills:  0        ATORVASTATIN CALCIUM PO   Dose:  40 mg        Take 40 mg by mouth daily   Refills:  0        budesonide-formoterol 80-4.5 MCG/ACT Inhaler   Commonly known as:  SYMBICORT    Dose:  2 puff        Inhale 2 puffs into the lungs daily as needed   Refills:  0        CHANTIX PO   Dose:  1 mg        Take 1 mg by mouth   Refills:  0        GABAPENTIN PO   Dose:  600 mg        Take 600 mg by mouth 3 times daily   Refills:  0        METFORMIN HCL PO   Dose:  1000 mg        Take 1,000 mg by mouth   Refills:  0        SUDAFED PO   Dose:  30 mg        Take 30 mg by mouth every 4 hours as needed for congestion   Refills:  0        TYLENOL PO   Dose:  1000 mg        Take 1,000 mg by mouth every 6 hours as needed for mild pain or fever   Refills:  0                Procedures and tests performed during your visit     CBC (platelets, no diff)    Comprehensive metabolic panel    Drug abuse screen 77 urine (W[place],RH,SH)    T4 free    TSH with free T4 reflex      Orders Needing Specimen Collection     None      Pending Results     No orders found from 5/9/2017 to 5/12/2017.            Pending Culture Results     No orders found from 5/9/2017 to 5/12/2017.            Pending Results Instructions     If you had any lab results that were not finalized at the time of your Discharge, you can call the ED Lab Result RN at [phone]. You will be contacted by this team for any positive Lab results or changes in treatment. The nurses are available 7 days a week from 10A to 6:30P.  You can leave a message 24 hours per day and they will return your call.        Test Results From Your Hospital Stay        5/11/2017 10:18 PM      Component Results     Component Value Ref Range & Units Status    Sodium 141 133 - 144 mmol/L Final    Potassium 4.3 3.4 - 5.3 mmol/L Final    Chloride 107 94 - 109 mmol/L Final    Carbon Dioxide 29 20 - 32 mmol/L Final    Anion Gap 5 3 - 14 mmol/L Final    Glucose 120 (H) 70 - 99 mg/dL Final    Urea Nitrogen 6 (L) 7 - 30 mg/dL Final    Creatinine 0.70 0.52 - 1.04 mg/dL Final    GFR Estimate 89 >60 mL/min/1.7m2 Final    Non  GFR Calc    GFR Estimate If Black >90    American GFR Calc   >60 mL/min/1.7m2 Final    Calcium 9.4 8.5 - 10.1 mg/dL Final    Bilirubin Total 0.3 0.2 - 1.3 mg/dL Final    Albumin 4.0 3.4 - 5.0 g/dL Final    Protein Total 7.6 6.8 - 8.8 g/dL Final    Alkaline Phosphatase 100 40 - 150 U/L Final    ALT 22 0 - 50 U/L Final    AST 13 0 - 45 U/L Final         5/11/2017  9:48 PM      Component Results     Component Value Ref Range & Units Status    WBC 7.4 4.0 - 11.0 10e9/L Final    RBC Count 4.27 3.8 - 5.2 10e12/L Final    Hemoglobin 14.3 11.7 - 15.7 g/dL Final    Hematocrit 42.5 35.0 - 47.0 % Final     78 - 100 fl Final    MCH 33.5 (H) 26.5 - 33.0 pg Final    MCHC 33.6 31.5 - 36.5 g/dL Final    RDW 12.5 10.0 - 15.0 % Final    Platelet Count 256 150 - 450 10e9/L Final         5/11/2017 10:24 PM      Component Results     Component Value Ref Range & Units Status    TSH 4.77 (H) 0.40 - 4.00 mU/L Final         5/11/2017 10:52 PM      Component Results     Component Value Ref Range & Units Status    Amphetamine Qual Urine  NEG Final    Negative   Cutoff for a negative amphetamine is 500 ng/mL or less.      Barbiturates Qual Urine  NEG Final    Negative   Cutoff for a negative barbiturate is 200 ng/mL or less.      Benzodiazepine Qual Urine  NEG Final    Negative   Cutoff for a negative benzodiazepine is 200 ng/mL or less.      Cannabinoids Qual Urine  NEG Final    Negative   Cutoff for a negative cannabinoid is 50 ng/mL or less.      Cocaine Qual Urine  NEG Final    Negative   Cutoff for a negative cocaine is 300 ng/mL or less.      Opiates Qualitative Urine  NEG Final    Negative   Cutoff for a negative opiate is 300 ng/mL or less.      PCP Qual Urine  NEG Final    Negative   Cutoff for a negative PCP is 25 ng/mL or less.           5/11/2017 10:37 PM      Component Results     Component Value Ref Range & Units Status    T4 Free 1.17 0.76 - 1.46 ng/dL Final                Clinical Quality Measure: Blood Pressure Screening     Your blood pressure was checked  "while you were in the emergency department today. The last reading we obtained was  BP: 131/88 . Please read the guidelines below about what these numbers mean and what you should do about them.  If your systolic blood pressure (the top number) is less than 120 and your diastolic blood pressure (the bottom number) is less than 80, then your blood pressure is normal. There is nothing more that you need to do about it.  If your systolic blood pressure (the top number) is 120-139 or your diastolic blood pressure (the bottom number) is 80-89, your blood pressure may be higher than it should be. You should have your blood pressure rechecked within a year by a primary care provider.  If your systolic blood pressure (the top number) is 140 or greater or your diastolic blood pressure (the bottom number) is 90 or greater, you may have high blood pressure. High blood pressure is treatable, but if left untreated over time it can put you at risk for heart attack, stroke, or kidney failure. You should have your blood pressure rechecked by a primary care provider within the next 4 weeks.  If your provider in the emergency department today gave you specific instructions to follow-up with your doctor or provider even sooner than that, you should follow that instruction and not wait for up to 4 weeks for your follow-up visit.        Thank you for choosing [facility]       Thank you for choosing [facility] for your care. Our goal is always to provide you with excellent care. Hearing back from our patients is one way we can continue to improve our services. Please take a few minutes to complete the written survey that you may receive in the mail after you visit with us. Thank you!        [employer] Information     [employer] lets you send messages to your doctor, view your test results, renew your prescriptions, schedule appointments and more. To sign up, go to [email or web address] . Click on \"Log in\" on the left side of the screen, which " "will take you to the Welcome page. Then click on \"Sign up Now\" on the right side of the page.     You will be asked to enter the access code listed below, as well as some personal information. Please follow the directions to create your username and password.     Your access code is: [email or web address]  Expires: 2017  2:30 PM     Your access code will  in 90 days. If you need help or a new code, please call your [facility] clinic or [phone].        Care EveryWhere ID     This is your Care EveryWhere ID. This could be used by other organizations to access your [facility] medical records  [id number]        After Visit Summary       This is your record. Keep this with you and show to your community pharmacist(s) and doctor(s) at your next visit.                  "

## 2017-05-12 NOTE — ED PROVIDER NOTES
History     Chief Complaint:  Psychiatric Evaluation      The history is provided by the patient and the spouse.      Marsha Charles is a 46 year old female who presents for psychiatric evaluation.  The patient reports it feels like someone is watching her and listening to her private conversations despite her  telling her this is not true.  Per spouse the paranoia seemed worse this evening prompting visit to the emergency department.  She was seen in [place] for similar approximately 5 years prior which resulted in a week long hospitalization.  She was on medications at that time and her  feels like they helped.  Has not been having regular care since and things are slowly getting worse.  The patient reports frequent headaches.  She states she went in for migraine evaluation before and was told they were not migraines after CT.  Patient reports she has not been taking her medications the last few days because she is forgetting to take them.  Over the last year patient endorses large swings in weight up to 50 pounds.  Patient does not feel she should be here, she does report feeling safe at home.  She denies chest pain, diarrhea, drug use, or other complaint.      Allergies:  No known drug allergies      Medications:    Albuterol inhaler  Metformin  Atorvastatin  Gabapentin  Chantix    Past Medical History:    Diabetes  High cholesterol  Paranoia    Past Surgical History:     x 2  Cholecystectomy  D&C  Hernia repair, incisional  Hysterectomy  Carpal tunnel  Sling transpubo without anterior colporrhaphy     Family History:    Depression  Anxiety  Bipolar disorder    Social History:  Presents with spouse   Tobacco use: 1.00 PPD over last week after quitting for 4 months  Alcohol use: Negative  PCP: Halina Dennison    Marital Status:         Review of Systems   Constitutional: Negative for unexpected weight change.   Cardiovascular: Negative for chest pain.   Gastrointestinal:  Negative for diarrhea.   Neurological: Positive for headaches.   All other systems reviewed and are negative.      Physical Exam     Patient Vitals for the past 24 hrs:   BP Temp Temp src Pulse Resp SpO2   05/11/17 2053 131/88 98.3  F (36.8  C) Oral 92 18 95 %        Physical Exam  Eyes:  Sclera white; Pupils are equal and round  ENT:    External ears and nares normal; no goiter  CV:  Regular rate and rhythm, No murmur   Resp:  Breath sounds clear and equal bilaterally  GI:  Abdomen is soft, non-tender, non-distended  MS:  Moves all extremities  Skin:  Warm and dry  Neuro: Speech is normal and fluent. No apparent deficit.  Psych:  Appropriately dressed, makes eye contact, calm      Emergency Department Course   Laboratory:  CBC: WNL (WBC 7.4, HGB 14.3, )   CMP: Glucose 120 (H), BUN 6 (L) ow WNL (Creatinine 0.70)   TSH with free T4 reflex: 4.77 (H)  T4 free: 1.17    UDS: None detected     Emergency Department Course:  Past medical records, nursing notes, and vitals reviewed.  2101: I performed an exam of the patient and obtained history, as documented above.   I had DEC assess the patient.   I personally reviewed the laboratory results with the Patient and spouse and answered all related questions prior to discharge.   2301: I rechecked the patient.  Findings and plan explained to the Patient and spouse. Patient discharged home with instructions regarding supportive care, medications, and reasons to return. The importance of close follow-up was reviewed.      Impression & Plan    Medical Decision Making:  Marsha Charles is a 46 year old female here for evaluation of worsening paranoid thoughts and behavior at home.  Medical evaluation was conducted to evaluate for electrolyte abnormalities, uremia, hepatitis, or thyroid abnormalities which were not found.  CareEverywhere reviewed and admission was 12/2011 due to depression and paranoia.  DEC was consulted and worked with the patient and family.  She does not  meet criteria for a hold as she is not suicidal or homicidal and is not having command hallucinations and continues to be able to function.  She was initially amenable to a voluntary admission but then changed her mind, she and her family would prefer outpatient management.  DEC offered to schedule something for them but due to insurance issues this was not possible.  They will be working through their insurance to schedule this on their own and understand they can return immediately at any time.      Diagnosis:    ICD-10-CM    1. Paranoia (H) F22        Disposition:  Discharged to home with plan as outlined.        Hubert Carlton  5/11/2017   [facility] EMERGENCY DEPARTMENT    I, Hubert Carlton, am serving as a scribe at 9:01 PM on 5/11/2017 to document services personally performed by Ida Joel MD based on my observations and the provider's statements to me.       Ida Joel MD  05/12/17 3044

## 2017-05-12 NOTE — ED NOTES
Pt presents with her .  states that pt is paranoid and believes that people are watching her and listening to her private conversations.

## 2019-05-12 ENCOUNTER — APPOINTMENT (OUTPATIENT)
Dept: GENERAL RADIOLOGY | Facility: CLINIC | Age: 49
End: 2019-05-12
Attending: EMERGENCY MEDICINE
Payer: COMMERCIAL

## 2019-05-12 ENCOUNTER — HOSPITAL ENCOUNTER (EMERGENCY)
Facility: CLINIC | Age: 49
Discharge: HOME | End: 2019-05-12
Attending: EMERGENCY MEDICINE | Admitting: EMERGENCY MEDICINE
Payer: COMMERCIAL

## 2019-05-12 VITALS
TEMPERATURE: 97.8 F | OXYGEN SATURATION: 99 % | RESPIRATION RATE: 18 BRPM | SYSTOLIC BLOOD PRESSURE: 108 MMHG | DIASTOLIC BLOOD PRESSURE: 78 MMHG
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
## 2019-05-12 DIAGNOSIS — M25.561 ACUTE PAIN OF RIGHT KNEE: ICD-10-CM

## 2019-05-12 PROCEDURE — 25000132 ZZH RX MED GY IP 250 OP 250 PS 637: Performed by: EMERGENCY MEDICINE

## 2019-05-12 PROCEDURE — 73562 X-RAY EXAM OF KNEE 3: CPT | Mod: RT

## 2019-05-12 PROCEDURE — 73552 X-RAY EXAM OF FEMUR 2/>: CPT | Mod: RT

## 2019-05-12 PROCEDURE — 29505 APPLICATION LONG LEG SPLINT: CPT | Mod: RT

## 2019-05-12 PROCEDURE — 99284 EMERGENCY DEPT VISIT MOD MDM: CPT | Mod: 25

## 2019-05-12 RX ORDER — HYDROCODONE BITARTRATE AND ACETAMINOPHEN 5; 325 MG/1; MG/1
1 TABLET ORAL EVERY 6 HOURS PRN
Qty: 8 TABLET | Refills: 0 | Status: SHIPPED | OUTPATIENT
Start: 2019-05-12 | End: 2020-03-17

## 2019-05-12 RX ORDER — IBUPROFEN 600 MG/1
600 TABLET, FILM COATED ORAL EVERY 6 HOURS PRN
Qty: 20 TABLET | Refills: 0 | Status: SHIPPED | OUTPATIENT
Start: 2019-05-12 | End: 2020-03-17

## 2019-05-12 RX ORDER — HYDROCODONE BITARTRATE AND ACETAMINOPHEN 5; 325 MG/1; MG/1
1 TABLET ORAL ONCE
Status: COMPLETED | OUTPATIENT
Start: 2019-05-12 | End: 2019-05-12

## 2019-05-12 RX ADMIN — HYDROCODONE BITARTRATE AND ACETAMINOPHEN 1 TABLET: 5; 325 TABLET ORAL at 15:04

## 2019-05-12 ASSESSMENT — ENCOUNTER SYMPTOMS
CHILLS: 0
WEAKNESS: 0
FEVER: 0
WOUND: 0
NUMBNESS: 0
ARTHRALGIAS: 1

## 2019-05-12 NOTE — LETTER
May 12, 2019      To Whom It May Concern:      Marsha Charles was seen in our Emergency Department today, 05/12/19.  I expect her condition to improve over the next 3 days.  She may return to work/school when improved.    Sincerely,        Lola Sevilla RN

## 2019-05-12 NOTE — ED PROVIDER NOTES
History     Chief Complaint:  Right Leg Pain    HPI   Marsha Charles is a 48 year old female who presents with right leg pain. The patient states she was working in the yard on a hill, pulling buckthorn out, when she slipped and fell to the ground, injuring her right leg in the process. She reports hearing a pop in her knee and began experiencing significant pain from mid-thigh to just below her knee in her right leg. The patient notes it has been difficult to bear weight since then, so she came to the ED for further evaluation. Here, the patient endorses the most weight over her right knee with some instability in her patella. She denies any numbness, weakness, significant swelling, or other acute symptoms or injuries.    Allergies:  No known drug allergies    Medications:    Albuterol inhaler  Atorvastatin  Symbicort  Gabapentin  Metformin  Chantix  Imitrex  Ventolin inhaler    Past Medical History:    Type II diabetes mellitus  Chronic obstructive pulmonary disease  Chronic constipation  Mixed incontinence  Depression  Mild intermittent asthma    Past Surgical History:     section x2  Cholecystectomy  Dilation and curettage x3  Create eardrum opening  Tonsillectomy  Hernia repair  Endometriosis laparoscopic procedure  Total abdominal hysterectomy with bilateral salpingo-oophorectomy  Sling transpubo without anterior colporrhaphy  Carpal tunnel surgery    Family History:    Pancreas cancer  Liver cancer  Hypertension    Social History:  Smoking status: Yes, 1 pack per day  Alcohol use: Yes, varies  Drug use: No  PCP: Halina Dennison  Presents to the ED with her spouse  Marital Status:  [2]     Review of Systems   Constitutional: Negative for chills and fever.   Musculoskeletal: Positive for arthralgias.   Skin: Negative for wound.   Neurological: Negative for weakness and numbness.   All other systems reviewed and are negative.    Physical Exam     Patient Vitals for the past 24 hrs:   BP  Temp Temp src Heart Rate Resp SpO2   05/12/19 1432 108/78 97.8  F (36.6  C) Temporal 97 18 99 %     Physical Exam   Nursing note and vitals reviewed.  Constitutional: Well nourished. Resting comfortably.   Eyes: Conjunctiva normal.  Pupils are equal, round, and reactive to light.   ENT: Nose normal. Mucous membranes pink and moist.    Neck: Normal range of motion.  CVS: Normal rate, regular rhythm.  Normal heart sounds.  No murmur.  Pulmonary: Lungs clear to auscultation bilaterally. No wheezes/rales/rhonchi.  GI: Abdomen soft. Nontender, nondistended. No rigidity or guarding.    MSK:     RLE Exam:  Inspection: Mild ecchymosis to R. knee  Palpation: TTP over the joint line and superior to patella  ROM:  Able to passively ROM RLE without difficulty  Strength: see below for distal exam.  Able to actively flex/ext/add/abd hip.  Sensation: Intact to light touch distally  Cap refil:   < 2 seconds distally.   DP/PT Pulses  normal  R. knee: full flex/ext w/o pain, no ttp.  R.  ankle: Able to flex/ext toes and DF/PF ankle actively. No TTP.  No reproducible hip pain.     Remainder of all extremities move equally and symmetrically. No c/t/l bony tenderness    Neuro: Alert. Follows simple commands.  Skin: Skin is warm and dry. No rash noted.   Psychiatric: Normal affect.     Emergency Department Course   Imaging:  Radiographic findings were communicated with the patient who voiced understanding of the findings.    XR Femur Right 2 views:  Negative femur x-ray.   As read by Radiology.    XR Knee Right 3 views:  No acute osseous abnormality demonstrated.  As read by Radiology.    Interventions:  1504: 1 tablet [place] PO    Emergency Department Course:  Past medical records, nursing notes, and vitals reviewed.  1450: I performed an exam of the patient and obtained history, as documented above.  The patient was sent for a right femur x-ray and right knee x-ray while in the emergency department, findings above.    1605: I rechecked  the patient. Findings and plan explained to the patient. She was placed in a knee immobilizer and is ambulating without difficulty. Patient discharged home with instructions regarding supportive care, medications, and reasons to return. The importance of close follow-up was reviewed.     Impression & Plan    Medical Decision Making:  Marsha Charles is a 48-year-old female presenting with right leg complaint. On exam, she has some mild right knee pain, though is neurovascularly intact. X-rays returned without focal fracture or dislocation. The patient was extremely hesitant to bear weight and I did discuss with her that given her presentation, I do have concerns for possible ligamentous injury. She was placed in a knee immobilizer as well as instructed on weightbearing as tolerated. We will plan for close orthopedic follow-up as an outpatient. I recommended RICE therapy as well as NSAIDs and Norco for breakthrough pain control. The remainder of her head to toe exam is without evidence of trauma. Return precautions were given.    Diagnosis:    ICD-10-CM   1. Fall, initial encounter W19.XXXA   2. Acute pain of right knee M25.561     Disposition: Discharged to home    Discharge Medications:  HYDROcodone-acetaminophen 5-325 MG tablet  Commonly known as:  NORCO  1 tablet, Oral, EVERY 6 HOURS PRN     ibuprofen 600 MG tablet  Commonly known as:  ADVIL/MOTRIN  600 mg, Oral, EVERY 6 HOURS PRN       Kati Moody  5/12/2019   [facility] EMERGENCY DEPARTMENT    Kati SPICER, am serving as a scribe at 2:50 PM on 5/12/2019 to document services personally performed by Liz Rogers DO based on my observations and the provider's statements to me.      Liz Rogers DO  05/12/19 4673

## 2019-05-12 NOTE — ED TRIAGE NOTES
Pt presents with R leg pain after slipping and falling doing yard work. States has tried to bear weight on leg but is unable to. ABCs intact.

## 2019-05-12 NOTE — ED AVS SNAPSHOT
[facility] Emergency Department  [address]  [facility] [address]  Phone:  [phone]  Fax:  [phone]                                    Marsha Charles   MRN: [id number]    Department:  [facility] Emergency Department   Date of Visit:  5/12/2019           After Visit Summary Signature Page    I have received my discharge instructions, and my questions have been answered. I have discussed any challenges I see with this plan with the nurse or doctor.    ..........................................................................................................................................  Patient/Patient Representative Signature      ..........................................................................................................................................  Patient Representative Print Name and Relationship to Patient    ..................................................               ................................................  Date                                   Time    ..........................................................................................................................................  Reviewed by Signature/Title    ...................................................              ..............................................  Date                                               Time          22EPIC Rev 08/18

## 2020-03-17 ENCOUNTER — APPOINTMENT (OUTPATIENT)
Dept: ULTRASOUND IMAGING | Facility: CLINIC | Age: 50
DRG: 190 | End: 2020-03-17
Attending: EMERGENCY MEDICINE
Payer: COMMERCIAL

## 2020-03-17 ENCOUNTER — VIRTUAL VISIT (OUTPATIENT)
Dept: FAMILY MEDICINE | Facility: OTHER | Age: 50
End: 2020-03-17

## 2020-03-17 ENCOUNTER — APPOINTMENT (OUTPATIENT)
Dept: CT IMAGING | Facility: CLINIC | Age: 50
DRG: 190 | End: 2020-03-17
Attending: EMERGENCY MEDICINE
Payer: COMMERCIAL

## 2020-03-17 ENCOUNTER — HOSPITAL ENCOUNTER (INPATIENT)
Facility: CLINIC | Age: 50
LOS: 2 days | Discharge: HOME | DRG: 190 | End: 2020-03-19
Attending: EMERGENCY MEDICINE | Admitting: HOSPITALIST
Payer: COMMERCIAL

## 2020-03-17 ENCOUNTER — APPOINTMENT (OUTPATIENT)
Dept: GENERAL RADIOLOGY | Facility: CLINIC | Age: 50
DRG: 190 | End: 2020-03-17
Attending: EMERGENCY MEDICINE
Payer: COMMERCIAL

## 2020-03-17 DIAGNOSIS — J44.89 COPD WITH CHRONIC BRONCHITIS (H): Primary | ICD-10-CM

## 2020-03-17 DIAGNOSIS — J44.1 COPD EXACERBATION (H): ICD-10-CM

## 2020-03-17 DIAGNOSIS — I26.99 BILATERAL PULMONARY EMBOLISM (H): ICD-10-CM

## 2020-03-17 LAB
ANION GAP SERPL CALCULATED.3IONS-SCNC: 3 MMOL/L (ref 3–14)
BUN SERPL-MCNC: 11 MG/DL (ref 7–30)
CALCIUM SERPL-MCNC: 9.3 MG/DL (ref 8.5–10.1)
CHLORIDE SERPL-SCNC: 106 MMOL/L (ref 94–109)
CO2 SERPL-SCNC: 32 MMOL/L (ref 20–32)
CREAT SERPL-MCNC: 0.68 MG/DL (ref 0.52–1.04)
D DIMER PPP FEU-MCNC: 0.7 UG/ML FEU (ref 0–0.5)
GFR SERPL CREATININE-BSD FRML MDRD: >90 ML/MIN/{1.73_M2}
GLUCOSE SERPL-MCNC: 143 MG/DL (ref 70–99)
POTASSIUM SERPL-SCNC: 3.5 MMOL/L (ref 3.4–5.3)
SODIUM SERPL-SCNC: 141 MMOL/L (ref 133–144)
TROPONIN I SERPL-MCNC: <0.015 UG/L (ref 0–0.04)

## 2020-03-17 PROCEDURE — 99285 EMERGENCY DEPT VISIT HI MDM: CPT | Mod: 25

## 2020-03-17 PROCEDURE — 85025 COMPLETE CBC W/AUTO DIFF WBC: CPT | Performed by: EMERGENCY MEDICINE

## 2020-03-17 PROCEDURE — 94640 AIRWAY INHALATION TREATMENT: CPT

## 2020-03-17 PROCEDURE — 25000128 H RX IP 250 OP 636: Performed by: EMERGENCY MEDICINE

## 2020-03-17 PROCEDURE — 25000131 ZZH RX MED GY IP 250 OP 636 PS 637: Performed by: EMERGENCY MEDICINE

## 2020-03-17 PROCEDURE — 96367 TX/PROPH/DG ADDL SEQ IV INF: CPT

## 2020-03-17 PROCEDURE — 84145 PROCALCITONIN (PCT): CPT | Performed by: HOSPITALIST

## 2020-03-17 PROCEDURE — 99223 1ST HOSP IP/OBS HIGH 75: CPT | Mod: AI | Performed by: HOSPITALIST

## 2020-03-17 PROCEDURE — 96365 THER/PROPH/DIAG IV INF INIT: CPT

## 2020-03-17 PROCEDURE — 93970 EXTREMITY STUDY: CPT

## 2020-03-17 PROCEDURE — 71275 CT ANGIOGRAPHY CHEST: CPT

## 2020-03-17 PROCEDURE — 85379 FIBRIN DEGRADATION QUANT: CPT | Performed by: EMERGENCY MEDICINE

## 2020-03-17 PROCEDURE — 84484 ASSAY OF TROPONIN QUANT: CPT | Performed by: HOSPITALIST

## 2020-03-17 PROCEDURE — 71045 X-RAY EXAM CHEST 1 VIEW: CPT

## 2020-03-17 PROCEDURE — 12000000 ZZH R&B MED SURG/OB

## 2020-03-17 PROCEDURE — 93005 ELECTROCARDIOGRAM TRACING: CPT

## 2020-03-17 PROCEDURE — 96366 THER/PROPH/DIAG IV INF ADDON: CPT

## 2020-03-17 PROCEDURE — 84484 ASSAY OF TROPONIN QUANT: CPT | Performed by: EMERGENCY MEDICINE

## 2020-03-17 PROCEDURE — 25000125 ZZHC RX 250: Performed by: EMERGENCY MEDICINE

## 2020-03-17 PROCEDURE — 80048 BASIC METABOLIC PNL TOTAL CA: CPT | Performed by: EMERGENCY MEDICINE

## 2020-03-17 RX ORDER — PREDNISONE 20 MG/1
40 TABLET ORAL ONCE
Status: COMPLETED | OUTPATIENT
Start: 2020-03-17 | End: 2020-03-17

## 2020-03-17 RX ORDER — DOXYCYCLINE HYCLATE 100 MG
100 TABLET ORAL 2 TIMES DAILY
Status: ON HOLD | COMMUNITY
Start: 2020-03-09 | End: 2020-03-19

## 2020-03-17 RX ORDER — SUMATRIPTAN 100 MG/1
100 TABLET, FILM COATED ORAL
Status: ON HOLD | COMMUNITY
Start: 2020-02-07 | End: 2023-11-02

## 2020-03-17 RX ORDER — IPRATROPIUM BROMIDE AND ALBUTEROL SULFATE 2.5; .5 MG/3ML; MG/3ML
3 SOLUTION RESPIRATORY (INHALATION)
Status: DISCONTINUED | OUTPATIENT
Start: 2020-03-17 | End: 2020-03-18 | Stop reason: ALTCHOICE

## 2020-03-17 RX ORDER — IBUPROFEN 200 MG
400 TABLET ORAL EVERY 6 HOURS PRN
COMMUNITY
End: 2022-04-10

## 2020-03-17 RX ORDER — RISPERIDONE 4 MG/1
4 TABLET ORAL DAILY
Status: ON HOLD | COMMUNITY
Start: 2020-03-09 | End: 2023-10-31

## 2020-03-17 RX ORDER — ESCITALOPRAM OXALATE 10 MG/1
10 TABLET ORAL DAILY
Status: ON HOLD | COMMUNITY
Start: 2020-03-09 | End: 2023-10-31

## 2020-03-17 RX ORDER — CEFTRIAXONE 2 G/1
2 INJECTION, POWDER, FOR SOLUTION INTRAMUSCULAR; INTRAVENOUS ONCE
Status: COMPLETED | OUTPATIENT
Start: 2020-03-17 | End: 2020-03-17

## 2020-03-17 RX ORDER — HYDROCODONE BITARTRATE AND ACETAMINOPHEN 5; 325 MG/1; MG/1
1 TABLET ORAL EVERY 6 HOURS PRN
Status: CANCELLED | OUTPATIENT
Start: 2020-03-17

## 2020-03-17 RX ORDER — HEPARIN SODIUM 10000 [USP'U]/100ML
18 INJECTION, SOLUTION INTRAVENOUS CONTINUOUS
Status: DISCONTINUED | OUTPATIENT
Start: 2020-03-17 | End: 2020-03-18

## 2020-03-17 RX ORDER — IOPAMIDOL 755 MG/ML
500 INJECTION, SOLUTION INTRAVASCULAR ONCE
Status: COMPLETED | OUTPATIENT
Start: 2020-03-17 | End: 2020-03-17

## 2020-03-17 RX ADMIN — IPRATROPIUM BROMIDE AND ALBUTEROL SULFATE 3 ML: .5; 3 SOLUTION RESPIRATORY (INHALATION) at 19:43

## 2020-03-17 RX ADMIN — IPRATROPIUM BROMIDE AND ALBUTEROL SULFATE 3 ML: .5; 3 SOLUTION RESPIRATORY (INHALATION) at 20:30

## 2020-03-17 RX ADMIN — SODIUM CHLORIDE 84 ML: 9 INJECTION, SOLUTION INTRAVENOUS at 20:43

## 2020-03-17 RX ADMIN — PREDNISONE 40 MG: 20 TABLET ORAL at 19:43

## 2020-03-17 RX ADMIN — CEFTRIAXONE 2 G: 2 INJECTION, POWDER, FOR SOLUTION INTRAMUSCULAR; INTRAVENOUS at 22:49

## 2020-03-17 RX ADMIN — HEPARIN SODIUM 18 UNITS/KG/HR: 10000 INJECTION, SOLUTION INTRAVENOUS at 22:48

## 2020-03-17 RX ADMIN — Medication 5800 UNITS: at 22:49

## 2020-03-17 RX ADMIN — IOPAMIDOL 65 ML: 755 INJECTION, SOLUTION INTRAVENOUS at 20:43

## 2020-03-17 ASSESSMENT — ENCOUNTER SYMPTOMS
ARTHRALGIAS: 0
RHINORRHEA: 0
ABDOMINAL PAIN: 0
SHORTNESS OF BREATH: 1
WHEEZING: 1
CHILLS: 1
PALPITATIONS: 0
CONFUSION: 0
NAUSEA: 0
VOMITING: 0
COUGH: 1
FEVER: 1
MYALGIAS: 0
SORE THROAT: 0
WEAKNESS: 0

## 2020-03-17 ASSESSMENT — MIFFLIN-ST. JEOR: SCORE: 1607.91

## 2020-03-17 NOTE — PROGRESS NOTES
"Date: 2020 16:55:17  Clinician: Gretel Bills  Clinician NPI: [id number]  Patient: Marsha Charles  Patient : 1970  Patient Address: [address] [place] [address]  Patient Phone: [phone]  Visit Protocol: URI  Patient Summary:  Marsha is a 49 year old ( : 1970 ) female who initiated a Visit for COVID-19 (Coronavirus) evaluation and screening. When asked the question \"Please sign me up to receive news, health information and promotions. \", Marsha responded \"No\".    Marsha states her symptoms started suddenly 10-13 days ago. After her symptoms started, they improved and then got worse again.   Her symptoms consist of wheezing, a cough, nasal congestion, malaise, a headache, and myalgia.   Symptom details     Nasal secretions: The color of her mucus is clear.    Cough: Marsha coughs a few times an hour and her cough is more bothersome at night. Phlegm comes into her throat when she coughs. She does not believe her cough is caused by post-nasal drip. The color of the phlegm is clear.     Wheezing: Marsha has been diagnosed with asthma. The wheezing interferes with her normal daily activities.    Headache: She states the headache is mild (1-3 on a 10 point pain scale).      Marsha denies having sore throat, teeth pain, fever, chills, ear pain, rhinitis, enlarged lymph nodes, and facial pain or pressure. She also denies having a sinus infection within the past year and having recent facial or sinus surgery in the past 60 days.   Precipitating events  She has not recently been exposed to someone with influenza. Marsha has not been in close contact with any high risk individuals.   Pertinent COVID-19 (Coronavirus) information  Marsha has not traveled internationally or to the areas where COVID-19 (Coronavirus) is widespread in the last 14 days before the start of her symptoms.   Marsha has not had a close contact with a laboratory-confirmed COVID-19 patient within 14 days of symptom onset. She also has not had a " close contact with a suspected COVID-19 patient within 14 days of symptom onset.   Marsha is not a healthcare worker and does not work in a healthcare facility.   Triage Point(s) temporarily suspended for COVID-19 (Coronavirus) screening  Marsha reported the following symptoms which were previously protocol referral points. These protocol referral points have temporarily been removed for purposes of COVID-19 (Coronavirus) screening.     Difficulty breathing even when resting and can only speak in phrase(s)    Wheezing that keeps Marsha from doing daily activities     Pertinent medical history  Marsha has taken an antibiotic medication in the past month. Antibiotic details as reported by the patient (free text): Doxycycline   Marsha does not get yeast infections when she takes antibiotics.   Marsha does not need a return to work/school note.   Weight: 220 lbs   Marsha smokes or uses smokeless tobacco.   She denies pregnancy and denies breastfeeding. She does not menstruate.   Additional information as reported by the patient (free text): I was seen for this same thing a week ago Monday, I was given prednisone and antibiotic, I have cold and asthma and have not gotten better at all.  It has been a workout for me just to go up my stairs and showers.   Weight: 220 lbs    MEDICATIONS: metformin oral, risperidone oral, Lexapro oral, gabapentin oral, atorvastatin oral, ALLERGIES: NKDA  Clinician Response:  Dear Marsha,   Dear Marsha Charles,  Based on the information you have provided, you have symptoms that indicate you may have coronavirus. Your symptoms suggest that you need to be seen in the Emergency Department (ED).   For your information: At this time the ED will NOT perform coronavirus testing unless your symptoms indicate you will be admitted to the hospital.  Leave now. Drive carefully. Follow these instructions:   You should go to the closest ED (not urgent care or urgency center).   Another adult should drive you to the ED.   If  able, call ahead to the ED you will go to ([facility] [phone]) to inform them of your symptoms and possible diagnosis of Coronavirus and get instructions about what entrance you should use to avoid going into the waiting area and risking infecting others.   If you cannot call ahead, send in your  inside first or tell the first person you meet in the ED that you have symptoms of coronavirus so you can be directed to the appropriate entrance and not be in the general waiting room.  From now until you arrive in the ED - Isolate Yourself:   Isolate yourself while traveling.   Do Not allow any visitors within 6 feet.   Do Not make any stops.   Do Not go to work or school.   Do Not go to [employer],  centers, shopping, or other public places.   Do Not shake hands.   Avoid close contact with others (hugging, kissing).  Protect Others:   Cover Your Mouth and Nose with a mask, disposable tissue or wash cloth to avoid spreading germs to others.   Wash your hands and face frequently with soap and water  For more information about COVID19 and options for caring for yourself at home, please visit the CDC website at https://www.cdc.gov/coronavirus/2019-ncov/about/steps-when-sick.html  For more options for care at [facility], please visit our website at [email or web address]        Diagnosis: Cough  Diagnosis ICD: R05

## 2020-03-18 ENCOUNTER — APPOINTMENT (OUTPATIENT)
Dept: CARDIOLOGY | Facility: CLINIC | Age: 50
DRG: 190 | End: 2020-03-18
Attending: HOSPITALIST
Payer: COMMERCIAL

## 2020-03-18 PROBLEM — R06.02 SHORTNESS OF BREATH: Status: ACTIVE | Noted: 2020-03-18

## 2020-03-18 LAB
ALBUMIN SERPL-MCNC: 3.2 G/DL (ref 3.4–5)
ALP SERPL-CCNC: 89 U/L (ref 40–150)
ALT SERPL W P-5'-P-CCNC: 22 U/L (ref 0–50)
ANION GAP SERPL CALCULATED.3IONS-SCNC: 6 MMOL/L (ref 3–14)
AST SERPL W P-5'-P-CCNC: 9 U/L (ref 0–45)
BASOPHILS # BLD AUTO: 0 10E9/L (ref 0–0.2)
BASOPHILS NFR BLD AUTO: 0.2 %
BILIRUB DIRECT SERPL-MCNC: <0.1 MG/DL (ref 0–0.2)
BILIRUB SERPL-MCNC: 0.2 MG/DL (ref 0.2–1.3)
BUN SERPL-MCNC: 11 MG/DL (ref 7–30)
C DIFF TOX B STL QL: NEGATIVE
C PNEUM DNA SPEC QL NAA+PROBE: NOT DETECTED
CALCIUM SERPL-MCNC: 8.8 MG/DL (ref 8.5–10.1)
CHLORIDE SERPL-SCNC: 105 MMOL/L (ref 94–109)
CO2 SERPL-SCNC: 24 MMOL/L (ref 20–32)
CREAT SERPL-MCNC: 0.58 MG/DL (ref 0.52–1.04)
DIFFERENTIAL METHOD BLD: ABNORMAL
EOSINOPHIL # BLD AUTO: 0.1 10E9/L (ref 0–0.7)
EOSINOPHIL NFR BLD AUTO: 1.5 %
ERYTHROCYTE [DISTWIDTH] IN BLOOD BY AUTOMATED COUNT: 12.5 % (ref 10–15)
ERYTHROCYTE [DISTWIDTH] IN BLOOD BY AUTOMATED COUNT: 12.5 % (ref 10–15)
FLUAV H1 2009 PAND RNA SPEC QL NAA+PROBE: NOT DETECTED
FLUAV H1 RNA SPEC QL NAA+PROBE: NOT DETECTED
FLUAV H3 RNA SPEC QL NAA+PROBE: NOT DETECTED
FLUAV RNA SPEC QL NAA+PROBE: NOT DETECTED
FLUBV RNA SPEC QL NAA+PROBE: NOT DETECTED
GFR SERPL CREATININE-BSD FRML MDRD: >90 ML/MIN/{1.73_M2}
GLUCOSE BLDC GLUCOMTR-MCNC: 218 MG/DL (ref 70–99)
GLUCOSE BLDC GLUCOMTR-MCNC: 223 MG/DL (ref 70–99)
GLUCOSE BLDC GLUCOMTR-MCNC: 254 MG/DL (ref 70–99)
GLUCOSE BLDC GLUCOMTR-MCNC: 277 MG/DL (ref 70–99)
GLUCOSE SERPL-MCNC: 249 MG/DL (ref 70–99)
HADV DNA SPEC QL NAA+PROBE: NOT DETECTED
HBA1C MFR BLD: 7.6 % (ref 0–5.6)
HCOV PNL SPEC NAA+PROBE: NOT DETECTED
HCT VFR BLD AUTO: 41.2 % (ref 35–47)
HCT VFR BLD AUTO: 44.5 % (ref 35–47)
HGB BLD-MCNC: 13.3 G/DL (ref 11.7–15.7)
HGB BLD-MCNC: 14.3 G/DL (ref 11.7–15.7)
HMPV RNA SPEC QL NAA+PROBE: NOT DETECTED
HPIV1 RNA SPEC QL NAA+PROBE: NOT DETECTED
HPIV2 RNA SPEC QL NAA+PROBE: NOT DETECTED
HPIV3 RNA SPEC QL NAA+PROBE: NOT DETECTED
HPIV4 RNA SPEC QL NAA+PROBE: NOT DETECTED
IMM GRANULOCYTES # BLD: 0.1 10E9/L (ref 0–0.4)
IMM GRANULOCYTES NFR BLD: 0.9 %
INTERPRETATION ECG - MUSE: NORMAL
LMWH PPP CHRO-ACNC: 0.49 IU/ML
LYMPHOCYTES # BLD AUTO: 3.3 10E9/L (ref 0.8–5.3)
LYMPHOCYTES NFR BLD AUTO: 41.3 %
M PNEUMO DNA SPEC QL NAA+PROBE: NOT DETECTED
MACROCYTES BLD QL SMEAR: PRESENT
MCH RBC QN AUTO: 31.4 PG (ref 26.5–33)
MCH RBC QN AUTO: 32.5 PG (ref 26.5–33)
MCHC RBC AUTO-ENTMCNC: 32.1 G/DL (ref 31.5–36.5)
MCHC RBC AUTO-ENTMCNC: 32.3 G/DL (ref 31.5–36.5)
MCV RBC AUTO: 101 FL (ref 78–100)
MCV RBC AUTO: 97 FL (ref 78–100)
MICROBIOLOGIST REVIEW: NORMAL
MONOCYTES # BLD AUTO: 0.4 10E9/L (ref 0–1.3)
MONOCYTES NFR BLD AUTO: 5.1 %
NEUTROPHILS # BLD AUTO: 4.1 10E9/L (ref 1.6–8.3)
NEUTROPHILS NFR BLD AUTO: 51 %
NRBC # BLD AUTO: 0 10*3/UL
NRBC BLD AUTO-RTO: 0 /100
PLATELET # BLD AUTO: 287 10E9/L (ref 150–450)
PLATELET # BLD AUTO: 310 10E9/L (ref 150–450)
PLATELET # BLD EST: ABNORMAL 10*3/UL
POTASSIUM SERPL-SCNC: 4.3 MMOL/L (ref 3.4–5.3)
PROCALCITONIN SERPL-MCNC: <0.05 NG/ML
PROT SERPL-MCNC: 6.6 G/DL (ref 6.8–8.8)
RBC # BLD AUTO: 4.24 10E12/L (ref 3.8–5.2)
RBC # BLD AUTO: 4.4 10E12/L (ref 3.8–5.2)
RSV RNA SPEC QL NAA+PROBE: NOT DETECTED
RSV RNA SPEC QL NAA+PROBE: NOT DETECTED
RV+EV RNA SPEC QL NAA+PROBE: NOT DETECTED
SODIUM SERPL-SCNC: 135 MMOL/L (ref 133–144)
SPECIMEN SOURCE: NORMAL
WBC # BLD AUTO: 7.3 10E9/L (ref 4–11)
WBC # BLD AUTO: 8 10E9/L (ref 4–11)

## 2020-03-18 PROCEDURE — 25000131 ZZH RX MED GY IP 250 OP 636 PS 637: Performed by: HOSPITALIST

## 2020-03-18 PROCEDURE — 94640 AIRWAY INHALATION TREATMENT: CPT

## 2020-03-18 PROCEDURE — 36415 COLL VENOUS BLD VENIPUNCTURE: CPT | Performed by: HOSPITALIST

## 2020-03-18 PROCEDURE — 00000146 ZZHCL STATISTIC GLUCOSE BY METER IP

## 2020-03-18 PROCEDURE — U0001 2019-NCOV DIAGNOSTIC P: HCPCS | Performed by: INTERNAL MEDICINE

## 2020-03-18 PROCEDURE — 40000275 ZZH STATISTIC RCP TIME EA 10 MIN

## 2020-03-18 PROCEDURE — 87486 CHLMYD PNEUM DNA AMP PROBE: CPT | Performed by: HOSPITALIST

## 2020-03-18 PROCEDURE — 93325 DOPPLER ECHO COLOR FLOW MAPG: CPT | Mod: 26 | Performed by: INTERNAL MEDICINE

## 2020-03-18 PROCEDURE — 87493 C DIFF AMPLIFIED PROBE: CPT | Performed by: INTERNAL MEDICINE

## 2020-03-18 PROCEDURE — 85520 HEPARIN ASSAY: CPT | Performed by: HOSPITALIST

## 2020-03-18 PROCEDURE — 80076 HEPATIC FUNCTION PANEL: CPT | Performed by: HOSPITALIST

## 2020-03-18 PROCEDURE — 93321 DOPPLER ECHO F-UP/LMTD STD: CPT | Mod: 26 | Performed by: INTERNAL MEDICINE

## 2020-03-18 PROCEDURE — 25000131 ZZH RX MED GY IP 250 OP 636 PS 637: Performed by: INTERNAL MEDICINE

## 2020-03-18 PROCEDURE — 80048 BASIC METABOLIC PNL TOTAL CA: CPT | Performed by: HOSPITALIST

## 2020-03-18 PROCEDURE — 85027 COMPLETE CBC AUTOMATED: CPT | Performed by: HOSPITALIST

## 2020-03-18 PROCEDURE — 87581 M.PNEUMON DNA AMP PROBE: CPT | Performed by: HOSPITALIST

## 2020-03-18 PROCEDURE — 12000000 ZZH R&B MED SURG/OB

## 2020-03-18 PROCEDURE — 87633 RESP VIRUS 12-25 TARGETS: CPT | Performed by: HOSPITALIST

## 2020-03-18 PROCEDURE — 40000274 ZZH STATISTIC RCP CONSULT EA 30 MIN

## 2020-03-18 PROCEDURE — 25000125 ZZHC RX 250: Performed by: HOSPITALIST

## 2020-03-18 PROCEDURE — 94640 AIRWAY INHALATION TREATMENT: CPT | Mod: 76

## 2020-03-18 PROCEDURE — 25000132 ZZH RX MED GY IP 250 OP 250 PS 637: Performed by: HOSPITALIST

## 2020-03-18 PROCEDURE — 93308 TTE F-UP OR LMTD: CPT | Mod: 26 | Performed by: INTERNAL MEDICINE

## 2020-03-18 PROCEDURE — 93325 DOPPLER ECHO COLOR FLOW MAPG: CPT

## 2020-03-18 PROCEDURE — 99233 SBSQ HOSP IP/OBS HIGH 50: CPT | Performed by: INTERNAL MEDICINE

## 2020-03-18 PROCEDURE — 83036 HEMOGLOBIN GLYCOSYLATED A1C: CPT | Performed by: HOSPITALIST

## 2020-03-18 RX ORDER — NALOXONE HYDROCHLORIDE 0.4 MG/ML
.1-.4 INJECTION, SOLUTION INTRAMUSCULAR; INTRAVENOUS; SUBCUTANEOUS
Status: DISCONTINUED | OUTPATIENT
Start: 2020-03-18 | End: 2020-03-19 | Stop reason: HOSPADM

## 2020-03-18 RX ORDER — PREDNISONE 20 MG/1
40 TABLET ORAL DAILY
Status: DISCONTINUED | OUTPATIENT
Start: 2020-03-18 | End: 2020-03-19 | Stop reason: HOSPADM

## 2020-03-18 RX ORDER — IPRATROPIUM BROMIDE AND ALBUTEROL SULFATE 2.5; .5 MG/3ML; MG/3ML
3 SOLUTION RESPIRATORY (INHALATION)
Status: DISCONTINUED | OUTPATIENT
Start: 2020-03-18 | End: 2020-03-19 | Stop reason: HOSPADM

## 2020-03-18 RX ORDER — CEFTRIAXONE 1 G/1
1 INJECTION, POWDER, FOR SOLUTION INTRAMUSCULAR; INTRAVENOUS EVERY 24 HOURS
Status: DISCONTINUED | OUTPATIENT
Start: 2020-03-18 | End: 2020-03-18

## 2020-03-18 RX ORDER — ONDANSETRON 4 MG/1
4 TABLET, ORALLY DISINTEGRATING ORAL EVERY 6 HOURS PRN
Status: DISCONTINUED | OUTPATIENT
Start: 2020-03-18 | End: 2020-03-19 | Stop reason: HOSPADM

## 2020-03-18 RX ORDER — GABAPENTIN 300 MG/1
600 CAPSULE ORAL 3 TIMES DAILY
Status: DISCONTINUED | OUTPATIENT
Start: 2020-03-18 | End: 2020-03-19 | Stop reason: HOSPADM

## 2020-03-18 RX ORDER — ONDANSETRON 2 MG/ML
4 INJECTION INTRAMUSCULAR; INTRAVENOUS EVERY 6 HOURS PRN
Status: DISCONTINUED | OUTPATIENT
Start: 2020-03-18 | End: 2020-03-19 | Stop reason: HOSPADM

## 2020-03-18 RX ORDER — ACETAMINOPHEN 500 MG
1000 TABLET ORAL EVERY 6 HOURS PRN
Status: DISCONTINUED | OUTPATIENT
Start: 2020-03-18 | End: 2020-03-19 | Stop reason: HOSPADM

## 2020-03-18 RX ORDER — ESCITALOPRAM OXALATE 10 MG/1
10 TABLET ORAL DAILY
Status: DISCONTINUED | OUTPATIENT
Start: 2020-03-18 | End: 2020-03-19 | Stop reason: HOSPADM

## 2020-03-18 RX ORDER — RISPERIDONE 2 MG/1
4 TABLET ORAL DAILY
Status: DISCONTINUED | OUTPATIENT
Start: 2020-03-18 | End: 2020-03-19 | Stop reason: HOSPADM

## 2020-03-18 RX ORDER — IBUPROFEN 600 MG/1
600 TABLET, FILM COATED ORAL EVERY 6 HOURS PRN
Status: DISCONTINUED | OUTPATIENT
Start: 2020-03-18 | End: 2020-03-19 | Stop reason: HOSPADM

## 2020-03-18 RX ORDER — NICOTINE 21 MG/24HR
1 PATCH, TRANSDERMAL 24 HOURS TRANSDERMAL DAILY
Status: DISCONTINUED | OUTPATIENT
Start: 2020-03-18 | End: 2020-03-19 | Stop reason: HOSPADM

## 2020-03-18 RX ORDER — ATORVASTATIN CALCIUM 40 MG/1
40 TABLET, FILM COATED ORAL DAILY
Status: DISCONTINUED | OUTPATIENT
Start: 2020-03-18 | End: 2020-03-19 | Stop reason: HOSPADM

## 2020-03-18 RX ORDER — LIDOCAINE 40 MG/G
CREAM TOPICAL
Status: DISCONTINUED | OUTPATIENT
Start: 2020-03-18 | End: 2020-03-19 | Stop reason: HOSPADM

## 2020-03-18 RX ORDER — NICOTINE POLACRILEX 4 MG
15-30 LOZENGE BUCCAL
Status: DISCONTINUED | OUTPATIENT
Start: 2020-03-18 | End: 2020-03-19 | Stop reason: HOSPADM

## 2020-03-18 RX ORDER — ALBUTEROL SULFATE 0.83 MG/ML
2.5 SOLUTION RESPIRATORY (INHALATION) EVERY 6 HOURS PRN
Status: DISCONTINUED | OUTPATIENT
Start: 2020-03-18 | End: 2020-03-19 | Stop reason: HOSPADM

## 2020-03-18 RX ORDER — DEXTROSE MONOHYDRATE 25 G/50ML
25-50 INJECTION, SOLUTION INTRAVENOUS
Status: DISCONTINUED | OUTPATIENT
Start: 2020-03-18 | End: 2020-03-19 | Stop reason: HOSPADM

## 2020-03-18 RX ORDER — HEPARIN SODIUM 10000 [USP'U]/100ML
1300 INJECTION, SOLUTION INTRAVENOUS CONTINUOUS
Status: DISCONTINUED | OUTPATIENT
Start: 2020-03-18 | End: 2020-03-18

## 2020-03-18 RX ADMIN — ATORVASTATIN CALCIUM 40 MG: 40 TABLET, FILM COATED ORAL at 09:52

## 2020-03-18 RX ADMIN — NICOTINE 1 PATCH: 14 PATCH, EXTENDED RELEASE TRANSDERMAL at 09:51

## 2020-03-18 RX ADMIN — GABAPENTIN 600 MG: 300 CAPSULE ORAL at 16:05

## 2020-03-18 RX ADMIN — PREDNISONE 40 MG: 20 TABLET ORAL at 09:52

## 2020-03-18 RX ADMIN — GABAPENTIN 600 MG: 300 CAPSULE ORAL at 22:22

## 2020-03-18 RX ADMIN — ESCITALOPRAM OXALATE 10 MG: 10 TABLET ORAL at 09:53

## 2020-03-18 RX ADMIN — IPRATROPIUM BROMIDE AND ALBUTEROL SULFATE 3 ML: .5; 3 SOLUTION RESPIRATORY (INHALATION) at 20:35

## 2020-03-18 RX ADMIN — RISPERIDONE 4 MG: 2 TABLET ORAL at 09:53

## 2020-03-18 RX ADMIN — IPRATROPIUM BROMIDE AND ALBUTEROL SULFATE 3 ML: .5; 3 SOLUTION RESPIRATORY (INHALATION) at 16:07

## 2020-03-18 RX ADMIN — GABAPENTIN 600 MG: 300 CAPSULE ORAL at 09:53

## 2020-03-18 RX ADMIN — IPRATROPIUM BROMIDE AND ALBUTEROL SULFATE 3 ML: .5; 3 SOLUTION RESPIRATORY (INHALATION) at 11:19

## 2020-03-18 RX ADMIN — INSULIN ASPART 3 UNITS: 100 INJECTION, SOLUTION INTRAVENOUS; SUBCUTANEOUS at 13:02

## 2020-03-18 RX ADMIN — IPRATROPIUM BROMIDE AND ALBUTEROL SULFATE 3 ML: .5; 3 SOLUTION RESPIRATORY (INHALATION) at 08:56

## 2020-03-18 RX ADMIN — INSULIN ASPART 3 UNITS: 100 INJECTION, SOLUTION INTRAVENOUS; SUBCUTANEOUS at 18:27

## 2020-03-18 RX ADMIN — NICOTINE 1 PATCH: 14 PATCH, EXTENDED RELEASE TRANSDERMAL at 01:34

## 2020-03-18 ASSESSMENT — ACTIVITIES OF DAILY LIVING (ADL)
ADLS_ACUITY_SCORE: 13

## 2020-03-18 ASSESSMENT — MIFFLIN-ST. JEOR: SCORE: 1600.2

## 2020-03-18 NOTE — PHARMACY-ADMISSION MEDICATION HISTORY
Admission medication history interview status for this patient is complete. See [facility] admission navigator for allergy information, prior to admission medications and immunization status.     Medication history interview source(s):Patient  Medication history resources (including written lists, pill bottles, clinic record): SureScripts and Care Everywhere  Primary pharmacy: [facility] [address] [place], [place] - [address]     Changes made to PTA medication list:  Added: doxycycline, escitalopram, sumatriptan, and risperidone  Deleted: hydrocodone-APAP, pseudoephedrine  Changed: none    Actions taken by pharmacist (provider contacted, etc): Called patient to verify med list.     Additional medication history information: Patient has not taken varenicline for the past few days due to feeling sick. Patient finished prednisone 20mg BID x7days on 3/16/20. Doxycycline 100mg BID [id number] was started 3/9/30 --> 3/18/20.     Medication reconciliation/reorder completed by provider prior to medication history?  Yes       Prior to Admission medications    Medication Sig Last Dose Taking? Auth Provider   Acetaminophen (TYLENOL PO) Take 1,000 mg by mouth every 6 hours as needed for mild pain or fever Past Week at Unknown time Yes Reported, Patient   albuterol (PROAIR HFA/PROVENTIL HFA/VENTOLIN HFA) 108 (90 BASE) MCG/ACT Inhaler Inhale 2 puffs into the lungs every 4 hours as needed for shortness of breath / dyspnea or wheezing 3/17/2020 at am Yes Reported, Patient   atorvastatin (LIPITOR) 40 MG tablet Take 40 mg by mouth daily  3/17/2020 at Unknown time Yes Reported, Patient   budesonide-formoterol (SYMBICORT) 80-4.5 MCG/ACT Inhaler Inhale 2 puffs into the lungs daily as needed 3/17/2020 at am Yes Reported, Patient   doxycycline hyclate (VIBRA-TABS) 100 MG tablet Take 100 mg by mouth 2 times daily For 10 days 3/17/2020 at am Yes Unknown, Entered By History   escitalopram (LEXAPRO) 10 MG tablet Take 10 mg by mouth daily  3/17/2020 at am Yes Unknown, Entered By History   gabapentin (NEURONTIN) 600 MG tablet Take 600 mg by mouth 3 times daily  3/17/2020 at Unknown time Yes Reported, Patient   ibuprofen (ADVIL/MOTRIN) 200 MG tablet Take 400 mg by mouth every 6 hours as needed for mild pain Past Week at Unknown time Yes Unknown, Entered By History   metFORMIN (GLUCOPHAGE) 1000 MG tablet Take 1,000 mg by mouth daily (with breakfast)  3/17/2020 at am Yes Reported, Patient   risperiDONE (RISPERDAL) 4 MG tablet Take 4 mg by mouth daily 3/17/2020 at am Yes Unknown, Entered By History   SUMAtriptan (IMITREX) 100 MG tablet Take 100 mg by mouth every 2 hours as needed for migraine Max 200mg per 24 hours. Past Month at Unknown time Yes Unknown, Entered By History   Varenicline Tartrate (CHANTIX PO) Take 1 mg by mouth twice daily Past Week at Unknown time Yes Reported, Patient

## 2020-03-18 NOTE — PLAN OF CARE
End of Shift Summary  For vital signs and complete assessments, please see documentation flowsheets.     Pertinent assessments: LS dim, denies pain, hep gtt infusing 13ml/hr, urinating frequently per pt baseline, isolation initiated while  resp panel pending, BG on adm 223.  Major Shift Events: pt sleeping on and off overnight related to urination, hep gtt  Treatment Plan: IV rocephin,  hep gtt    Discharge Readiness: Medically active  Expected Discharge Date: 2-3 days  Discharge Disposition: Home with Self care  Barriers/Criteria for discharge: treatment plan

## 2020-03-18 NOTE — H&P
[facility]    History and Physical  Hospitalist       Date of Admission:  3/17/2020    Assessment & Plan   Marsha Charles is a 49 year old female history of COPD, diabetes, hypercholesterolemia who presents with shortness of breath x10 to 11 days.    In the ED, patient afebrile but tachycardic to 100 and tachypneic 24.  Blood pressures were normal.  She was saturating in the high 80s and low 90s on room air.  Lab work was notable for a normal BMP.  Troponin was negative.  CBC was unremarkable.  Her d-dimer was checked and was positive.  CT PE was performed and showed a tiny burden of bilateral acute PE.  Also showed groundglass opacity in the left upper lung and airway thickening.  Lower extremity Dopplers were performed and negative for DVT. She was started on prednisone, DuoNebs, ceftriaxone and heparin drip.    #COPD Exacerbation: Patient has bilateral expiratory wheezing with prolonged expiratory phase noted.  I suspect this is the main  of her presenting symptoms.  Patient symptoms started 10 to 11 days ago.  She notes increased cough with sputum production.  She was seen by an outpatient provider and prescribed a course of prednisone and doxycycline with some improvement in symptoms but recurred over the last couple days.  Patient does note ill contact with her  who is been ill with a viral illness for the last several weeks.  She has no recent travel.  No known contact with any +COVID-19 patients.  -We will treat with prednisone, scheduled duo nebs, albuterol PRN  -We will continue ceftriaxone, checking procalcitonin.  If negative then would stop antibiotics  -We will check a respiratory viral panel  -Droplet precautions    #Acute PE: I do not suspect that these are the main  of her presenting symptoms as they are noted to be very tiny.  Suspect they may be related to her recent surgery in December on her right foot.  KG showed sinus rhythm without any acute ST segment  changes.  Troponin was negative  -TTE ordered  -Heparin drip  -telemetry  -Pharmacy liason consult for DOAC pricing.    Chronic Medical Conditions  #Hyperlipidemia: Continue home atorvastatin  #Diabetes mellitus: Continue home metformin  #Tobacco use disorder: Tobacco cessation advised.  Patch is ordered.  #Major depressive disorder with psychotic features: Continue home risperidone and escitalopram    DVT Prophylaxis: Heparin Gtt  Code Status: Full Code  Expected discharge: 2 - 3 days, recommended to prior living arrangement     Yair Sr MD    Primary Care Physician   DAQUAN VAN    Chief Complaint   SOB    History is obtained from the patient, patient's chart and discussed with ER physician     History of Present Illness   Marsha Charles is a 49 year old female history of COPD, diabetes, hypercholesterolemia who presents with shortness of breath x10 to 11 days.    Patient symptoms started about 10 to 11 days ago with shortness of breath that seem to be worse with exertion.  She also notes associated fevers and chills.  She also notes increased cough with sputum production.  Sputum has been mainly clear to yellow.  She does note that her  with whom she lives has also been ill with a viral illness over the last several weeks.  She did see an outpatient provider at [facility] approximately 1 week ago and was prescribed a one-week course of prednisone and doxycycline.  She states that this briefly did help her symptoms.  She states that her symptoms seem to worsen over the last couple days.  She presented to the ER for further evaluation.  She denies any chest discomfort with her symptoms.  No unilateral leg swelling.  She does note that she had surgery on her foot in December 2019 and was immobile for quite a while.  She has no recent travel.  No known contacts of COVID 19.    In the ED, patient afebrile but tachycardic to 100 and tachypneic 24.  Blood pressures were normal.  She was saturating in  the high 80s and low 90s on room air.  Lab work was notable for a normal BMP.  Troponin was negative.  CBC was unremarkable.  Her d-dimer was checked and was positive.  CT PE was performed and showed a tiny burden of bilateral acute PE.  Also showed groundglass opacity in the left upper lung and airway thickening.  Lower extremity Dopplers were performed and negative for DVT.  Does started on prednisone, DuoNeb, ceftriaxone and heparin drip.    Past Medical History    I have reviewed this patient's medical history and updated it with pertinent information if needed.   Past Medical History:   Diagnosis Date     Diabetes (H)     Type 2     High cholesterol        Past Surgical History   I have reviewed this patient's surgical history and updated it with pertinent information if needed.  Past Surgical History:   Procedure Laterality Date     C/SECTION, LOW TRANSVERSE      x 2     CHOLECYSTECTOMY       D & C      x 3     HERNIA REPAIR, INCISIONAL       HYSTERECTOMY, VAGINAL       ORTHOPEDIC SURGERY      Carpal tunnel     SLING TRANSPUBO WITHOUT ANTERIOR COLPORRHAPHY N/A 12/12/2016    Procedure: SLING TRANSPUBO WITHOUT ANTERIOR COLPORRHAPHY;  Surgeon: Luis Hanson MD;  Location:  OR       Prior to Admission Medications   Prior to Admission Medications   Prescriptions Last Dose Informant Patient Reported? Taking?   ATORVASTATIN CALCIUM PO   Yes No   Sig: Take 40 mg by mouth daily   Acetaminophen (TYLENOL PO)   Yes No   Sig: Take 1,000 mg by mouth every 6 hours as needed for mild pain or fever   GABAPENTIN PO   Yes No   Sig: Take 600 mg by mouth 3 times daily    HYDROcodone-acetaminophen (NORCO) 5-325 MG tablet   No No   Sig: Take 1 tablet by mouth every 6 hours as needed for pain   METFORMIN HCL PO   Yes No   Sig: Take 1,000 mg by mouth   Pseudoephedrine HCl (SUDAFED PO)   Yes No   Sig: Take 30 mg by mouth every 4 hours as needed for congestion   Varenicline Tartrate (CHANTIX PO)   Yes No   Sig: Take 1 mg by  mouth   albuterol (PROAIR HFA/PROVENTIL HFA/VENTOLIN HFA) 108 (90 BASE) MCG/ACT Inhaler   Yes No   Sig: Inhale 2 puffs into the lungs every 4 hours as needed for shortness of breath / dyspnea or wheezing   budesonide-formoterol (SYMBICORT) 80-4.5 MCG/ACT Inhaler   Yes No   Sig: Inhale 2 puffs into the lungs daily as needed   ibuprofen (ADVIL/MOTRIN) 600 MG tablet   No No   Sig: Take 1 tablet (600 mg) by mouth every 6 hours as needed for moderate pain      Facility-Administered Medications: None     Allergies   No Known Allergies    Social History   I have reviewed this patient's social history and updated it with pertinent information if needed. Marsha Charles  reports that she has been smoking. She has never used smokeless tobacco. She reports that she does not drink alcohol or use drugs.    Family History   I have reviewed this patient's family history and updated it with pertinent information if needed.   +FH of emphysema    Review of Systems   The 10 point Review of Systems is negative other than noted in the HPI or here.     Physical Exam   Temp: 97.3  F (36.3  C) Temp src: Oral BP: 125/74 Pulse: 96 Heart Rate: 107 Resp: 24 SpO2: 90 % O2 Device: None (Room air)    Vital Signs with Ranges  Temp:  [97.3  F (36.3  C)] 97.3  F (36.3  C)  Pulse:  [] 96  Heart Rate:  [107] 107  Resp:  [24] 24  BP: (114-158)/(73-95) 125/74  SpO2:  [89 %-96 %] 90 %  0 lbs 0 oz    Constitutional: NAD, Nontoxic, conversational  HEENT: Mucous membranes moist, normocephalic.  No elevation of JVD  Respiratory: Scant air entry.  She does have bilateral expiratory wheezing with prolonged expiratory phase noted.  No rhonchi noted  Cardiovascular: Regular, no murmur noted  GI: Sounds present, nontender nondistended  Lymph/Hematologic: Some scattered bruising noted  Skin: No rashes  Musculoskeletal: Normal tone  Neurologic: Cranial nerves II through XII intact, moves all extremities.  No tremor noted  Psychiatric: Calm and  cooperative    Data   Data reviewed today:  I personally reviewed   Recent Labs   Lab 03/17/20  1933   WBC 8.0   HGB 14.3   *         POTASSIUM 3.5   CHLORIDE 106   CO2 32   BUN 11   CR 0.68   ANIONGAP 3   KEREN 9.3   *       Recent Results (from the past 24 hour(s))   XR Chest Port 1 View    Narrative    EXAM: XR CHEST PORT 1 VW  LOCATION: [facility]  DATE/TIME: 3/17/2020 7:44 PM    INDICATION: Cough, shortness of breath  COMPARISON: None.      Impression    IMPRESSION: Degenerative disease. No acute infiltrate. Heart and central vessels unremarkable.   CT Chest Pulmonary Embolism w Contrast    Narrative    EXAM: CT CHEST PULMONARY EMBOLISM W CONTRAST  LOCATION: [facility]  DATE/TIME: 3/17/2020 8:41 PM    INDICATION: PE suspected, intermediate prob, positive D-dimer, shortness of breath, wheezing.  COMPARISON: None.  TECHNIQUE: CT angiogram chest during arterial phase injection IV contrast. 2D and 3D MIP reconstructions were performed by the CT technologist. Dose reduction techniques were used.   CONTRAST: 65mL Isovue-370    FINDINGS:  ANGIOGRAM CHEST: Tiny burden bilateral acute pulmonary emboli. Coronary artery calcification. No aortic aneurysm or dissection. No CT evidence of right heart strain.    LUNGS AND PLEURA: 1.4 x 0.5 cm groundglass opacity left upper lobe on image #51. 5 mm benign-appearing pleural-based nodule along left major fissure on image #158. Scattered fibroatelectasis and airway thickening most prominent at the lung bases.    MEDIASTINUM/AXILLAE: Normal.    UPPER ABDOMEN: Cholecystectomy.    MUSCULOSKELETAL: Degenerative disease.      Impression    IMPRESSION:  1.  Tiny burden of bilateral acute pulmonary emboli.  2.  1.4 x 0.5 cm groundglass opacity left upper lobe lung with scattered fibroatelectasis and airway thickening. Findings likely infectious or inflammatory. If no prior, follow-up noncontrast CT recommended in 3 months to ensure  resolution and exclude   other underlying pathology.    Results called to Flip Del Rio at 9:05 PM.   US Lower Extremity Venous Duplex Bilateral    Narrative    EXAM: US LOWER EXTREMITY VENOUS DUPLEX BILATERAL  LOCATION: [facility]  DATE/TIME: 3/17/2020 9:59 PM    INDICATION: Pulmonary embolus. Evaluate for DVT.  COMPARISON: CT 03/17/2020.  TECHNIQUE: Venous Duplex ultrasound of bilateral lower extremities with and without compression, augmentation and duplex. Color flow and spectral Doppler with waveform analysis performed.    FINDINGS: Exam includes the common femoral, femoral, popliteal veins as well as segmentally visualized deep calf veins and greater saphenous vein.     RIGHT: No deep vein thrombosis. No superficial thrombophlebitis. No popliteal cyst.    LEFT: No deep vein thrombosis. No superficial thrombophlebitis. No popliteal cyst.      Impression    IMPRESSION:  1.  No deep venous thrombosis in the bilateral lower extremities.

## 2020-03-18 NOTE — PHARMACY-RX INSURANCE COVERAGE
Anticoagulation coverage check.  Patient has insurance through an employer.    Xarelto:  $110/mo. Upon receipt of RX [facility] can provide copay savings card to reduce this to $10/month.    Eliquis:  $110/mo. Upon receipt of RX Discharge Pharm[facility] can provide copay savings card to reduce this to $10/month.    Pradaxa:  $15/month.    [facility]  [facility] Liaison  Liaison Cell: [phone]

## 2020-03-18 NOTE — ED PROVIDER NOTES
"  History     Chief Complaint:  Shortness of Breath and Wheezing       HPI   Marsha Charles is a 49 year old female who presents with persistent shortness of breath, cough and chest discomfort for the last 8 days.  She initially reports she felt ill with a mild fever of \"101\" and cough a week ago Monday.  She denies having any rhinorrhea, sore throat, muscle aches or pains at the time.  No recent travel or sick contacts.  She was seen in an outpatient clinic and treated for COPD exacerbation with doxycycline and prednisone.  She reports she completed the prednisone for 5 days and reports that she has 1 day left of doxycycline.  Since then she denies any ongoing fevers, URI symptoms, sore throat.  She reports that she is unable to lay down due to her shortness of breath.  Despite the outpatient treatment she reports that her shortness of breath cough and chest discomfort has not improved.  She reports that this feels different from prior COPD flares.  She reports that she continues to smoke.  She denies any other symptoms at this time.    Allergies:  No Known Allergies     Medications:    Atorvastatin   Gabapentin   Metformin   chantix     Past Medical History:    Diabetes (H)   High cholesterol  COPD  Diabetic neuropathy     Past Surgical History:       Cholecystectomy   D&C  Hernia repair   Hysterectomy   Carpal tunnel   Sling transpubo    Family History:    Family history reviewed. No pertinent family history.      Social History:  Smoking Status: current smoker  Smokeless Tobacco: Never Used  Alcohol Use: No  Drug Use: No  PCP: Jon Young     Review of Systems   Constitutional: Positive for chills and fever.   HENT: Negative for congestion, rhinorrhea and sore throat.    Respiratory: Positive for cough, shortness of breath and wheezing.    Cardiovascular: Positive for chest pain. Negative for palpitations and leg swelling.   Gastrointestinal: Negative for abdominal pain, nausea and " "vomiting.   Musculoskeletal: Negative for arthralgias and myalgias.   Neurological: Negative for syncope and weakness.   Psychiatric/Behavioral: Negative for confusion.   All other systems reviewed and are negative.        Physical Exam     Patient Vitals for the past 24 hrs:   BP Temp Temp src Pulse Heart Rate Resp SpO2 Height Weight   03/17/20 2113 -- -- -- -- -- -- 90 % 1.626 m (5' 4\") 99.8 kg (220 lb)   03/17/20 2112 -- -- -- -- -- -- 90 % -- --   03/17/20 2030 125/74 -- -- 96 -- -- (!) 89 % -- --   03/17/20 2015 130/73 -- -- 98 -- -- (!) 89 % -- --   03/17/20 2000 126/74 -- -- 96 -- -- 92 % -- --   03/17/20 1955 -- -- -- -- -- -- 91 % -- --   03/17/20 1945 114/81 -- -- 100 -- -- 94 % -- --   03/17/20 1930 (!) 133/91 -- -- 95 -- -- 91 % -- --   03/17/20 1857 (!) 158/95 97.3  F (36.3  C) Oral -- 107 24 96 % -- --        Physical Exam  General: Well appearing, nontoxic. Resting comfortably  Head:  Scalp, face, and head appear normal  Eyes:  Pupils are equal, round, and reactive to light    Conjunctivae non-injected and sclerae white  ENT:    The external nose is normal    Pinnae are normal    The oropharynx is normal, mucous membranes moist    Posterior pharynx clear without swelling, exudates or erythema    Uvula is in the midline  Neck:  Normal range of motion    There is no rigidity noted    Trachea is in the midline  CV:  Regular rate and rhythm     Normal S1/S2, no S3/S4    No murmur or rub. Radial pulses 2+ bilaterally  Resp:  Lungs are equal bilaterally    There is no tachypnea    No increased work of breathing    Diffuse expiratory wheezing throughout.  No rales or rhonchi.  GI:  Abdomen is soft, obese, no rigidity or guarding    No distension, or mass    No tenderness or rebound tenderness   MS:  Normal muscular tone    Symmetric motor strength    No lower extremity edema. No calf swelling or tenderness.  Skin:  No rash or acute skin lesions noted  Neuro: Awake and alert    Speech is normal and " fluent    Moves all extremities spontaneously  Psych:  Normal affect.  Appropriate interactions.      Emergency Department Course   ECG:  ECG taken at 2258, ECG read at 2300  Normal sinus rhythm  Nonspecific ST changes  Rate 93 bpm. SD interval 116 ms. QRS duration 80 ms. QT/QTc 374/465 ms. P-R-T axes 38 80 25.     Imaging:  Radiology findings were communicated with the patient who voiced understanding of the findings.    US Lower Extremity Venous Duplex Bilateral  Final Result  IMPRESSION:  1.  No deep venous thrombosis in the bilateral lower extremities.  Reading per radiology       CT Chest Pulmonary Embolism w Contrast  Final Result  IMPRESSION:  1.  Tiny burden of bilateral acute pulmonary emboli.  2.  1.4 x 0.5 cm groundglass opacity left upper lobe lung with scattered fibroatelectasis and airway thickening. Findings likely infectious or inflammatory. If no prior, follow-up noncontrast CT recommended in 3 months to ensure resolution and exclude   other underlying pathology.    Results called to Flip Del Rio at 9:05 PM.  Reading per radiology       XR Chest Port 1 View  Final Result  IMPRESSION: Degenerative disease. No acute infiltrate. Heart and central vessels unremarkable.  Reading per radiology       Laboratory:  Laboratory findings were communicated with the patient who voiced understanding of the findings.    CBC:  WBC 8.0, HGB 14.3, , o/w WNL     D dimer quantitative: 0.7 (H)    BMP: Glucose 143 (H), o/w WNL (Creatinine: 0.68)       Troponin(1933):  <0.015    Procalcitonin: pending     Interventions:  1943 Duoneb, 3 mL, nebulization    1943 Prednisone 40 mg oral   2248 Heparin 80549 units 1300 units/hr infusion IV  2249 Rocephin 2 g IV  2249 Heparin loading dose 5800 units IV    Emergency Department Course:  Past medical records, nursing notes, and vitals reviewed.    1928 I performed an exam of the patient as documented above.    IV was inserted and blood was drawn for laboratory testing, results  above.     The patient was sent for imaging while in the emergency department, results above.       2110 I spoke with Dr. Jimenez of the Radiology service regarding patient's presentation, findings, and plan of care.      2231 Patient rechecked and updated.      2228 I spoke with Dr. Sr of the Hospitalist service from [facility] regarding patient's presentation, findings, and plan of care.       Findings and plan explained to the Patient who consents to admission. Discussed the patient with Dr. Sr, who will admit the patient to a cardiac tele bed for further monitoring, evaluation, and treatment.      Impression & Plan       Medical Decision Making:  Marsha Charles is a 49 year old female who presents to the emergency department today with persistent shortness of breath cough and chest discomfort in the setting of recent COPD exacerbation treated as an outpatient with burst of prednisone and doxycycline.  On my evaluation she is nontoxic without any evidence of acute respiratory failure.  No significant increased work of breathing or respiratory distress.  She has diffuse expiratory wheezes on auscultation.  No significant hypoxia.  A broad differential diagnosis is considered including persistent COPD exacerbation, pneumonia, bronchitis, URI, viral syndrome, COVID 19, influenza, pleural effusion, pulmonary edema.  She has no peripheral edema to suggest CHF or right-sided heart failure.  She denies any prior cardiac history. ECG unchanged from baseline without evidence of acute ischemia or dysrhythmia nor right heart strain.  She is currently afebrile and well-appearing without any ongoing URI symptoms malaise or other symptoms to suggest influenza or COVID 19.  I feel that these are less likely and are much lower on the differential.  Work-up in the emergency department reveals normal CBC and BMP.  D-dimer was elevated at 0.7 therefore CT of the chest was obtained which reveals very small bilateral subsegmental  pulmonary emboli.  There is an additional small area of groundglass opacity in the left upper lobe which may represent infectious versus inflammatory process.  No other acute finding seen on chest CT.  Ultrasound of the bilateral lower extremities is negative for DVT.  Ultimately her pulmonary emboli are very small no evidence of heart strain.  She is hemodynamically stable.  Patient was started on heparin for treatment of this.  Ultimately I feel that the main  symptoms is likely her COPD with potential persistent underlying infection/inflammation.  Patient was treated with prednisone and duo nebs in the ED with improvement in her symptoms.  The case was discussed with the hospitalist who recommended ceftriaxone.  This was ordered.  Patient will be admitted for ongoing monitoring evaluation and treatment.  Patient was agreeable with the plan of care and she was admitted in stable condition.      Discharge Diagnosis:    ICD-10-CM    1. Bilateral pulmonary embolism (H)  I26.99 Troponin I     Troponin I     CANCELED: Troponin I   2. COPD exacerbation (H)  J44.1        Disposition:  Admitted to hospitalist.    Discharge Medications:  New Prescriptions    No medications on file       Scribe Disclosure:  Zaid SPICER, am serving as a scribe at 7:27 PM on 3/17/2020 to document services personally performed by Flip Del Rio MD based on my observations and the provider's statements to me.      3/17/2020   Flip Del Rio MD Daro, Ryan Clay, MD  03/18/20 1348

## 2020-03-18 NOTE — ED NOTES
[facility]  ED Nurse Handoff Report    Marsha Charles is a 49 year old female   ED Chief complaint: Shortness of Breath and Wheezing  . ED Diagnosis:   Final diagnoses:   Bilateral pulmonary embolism (H)   COPD exacerbation (H)     Allergies: No Known Allergies    Code Status: Full Code  Activity level - Baseline/Home:  Independent. Activity Level - Current:   Assist X 1. Lift room needed: No. Bariatric: No   Needed: No   Isolation: No. Infection: Not Applicable.     Vital Signs:   Vitals:    03/17/20 2015 03/17/20 2030 03/17/20 2112 03/17/20 2113   BP: 130/73 125/74     Pulse: 98 96     Resp:       Temp:       TempSrc:       SpO2: (!) 89% (!) 89% 90% 90%       Cardiac Rhythm:  ,      Pain level: 0-10 Pain Scale: 0  Patient confused: No. Patient Falls Risk: Yes.   Elimination Status: Has voided   Patient Report - Initial Complaint: Shortness of breath, wheezing. Focused Assessment:     Shortness of breath, cough and wheezing for the past week. .   Cardiac - Cardiac WDL: WDL   Chest Pain Assessment - Rating (0-10):  (Pt denies chest pain ) Chest Pain Reproducible?: No   Respiratory - Respiratory WDL: -WDL except; breath sounds; rhythm/pattern; cough  Rhythm/Pattern, Respiratory: dyspnea on exertion  Breath Sounds: All Fields  Cough Frequency: frequent  Cough Type: nonproductive   Head To Toe Assessment - All Lung Fields Breath Sounds: Posterior:; wheezes, inspiratory; wheezes, expiratory     Pt reports dyspnea on exertion, pt is daily smoker, pt ambulating assist of one, pt appears anxious     Tests Performed:   US Lower Extremity Venous Duplex Bilateral   Preliminary Result   IMPRESSION:   1.  No deep venous thrombosis in the bilateral lower extremities.      CT Chest Pulmonary Embolism w Contrast   Final Result   IMPRESSION:   1.  Tiny burden of bilateral acute pulmonary emboli.   2.  1.4 x 0.5 cm groundglass opacity left upper lobe lung with scattered fibroatelectasis and airway  thickening. Findings likely infectious or inflammatory. If no prior, follow-up noncontrast CT recommended in 3 months to ensure resolution and exclude    other underlying pathology.      Results called to Flip Del Rio at 9:05 PM.      XR Chest Port 1 View   Final Result   IMPRESSION: Degenerative disease. No acute infiltrate. Heart and central vessels unremarkable.        . Abnormal Results:   Labs Ordered and Resulted from Time of ED Arrival Up to the Time of Departure from the ED   CBC WITH PLATELETS DIFFERENTIAL - Abnormal; Notable for the following components:       Result Value     (*)     All other components within normal limits   D DIMER QUANTITATIVE - Abnormal; Notable for the following components:    D Dimer 0.7 (*)     All other components within normal limits   BASIC METABOLIC PANEL - Abnormal; Notable for the following components:    Glucose 143 (*)     All other components within normal limits   PLATELETS MONITORED PER HEPARIN TREATMENT PROTOCOL (FOR MEANINGFUL USE   PERIPHERAL IV CATHETER   MEASURE WEIGHT   NOTIFY PHYSICIAN   NOTIFY PHYSICIAN     .   Treatments provided: Meds, labs, CT, US, Xray   Family Comments: Updated spouse, Jose, on plan of care   OBS brochure/video discussed/provided to patient:  N/A  ED Medications:   Medications   ipratropium - albuterol 0.5 mg/2.5 mg/3 mL (DUONEB) neb solution 3 mL (3 mLs Nebulization Given 3/17/20 1943)   predniSONE (DELTASONE) tablet 40 mg (40 mg Oral Given 3/17/20 1943)   CT Scan Flush (84 mLs Intravenous Given 3/17/20 2043)   iopamidol (ISOVUE-370) solution 500 mL (65 mLs Intravenous Given 3/17/20 2043)     Drips infusing:  No  For the majority of the shift, the patient's behavior Green. Interventions performed were N/A.    Sepsis treatment initiated: No       ED Nurse Name/Phone Number: Makenzie Morley RN,   10:20 PM    RECEIVING UNIT ED HANDOFF REVIEW    Above ED Nurse Handoff Report was reviewed: Yes  Reviewed by: Venice Henley RN on March 18,  2020 at 12:57 AM

## 2020-03-18 NOTE — PROGRESS NOTES
"Count includes the [facility] RCAT     Date: 3/18/2020  Admission Dx: COPD  Pulmonary History COPD  Home Nebulizer/MDI Use: Albuterol HFA 2 puffs Q4 hours prn, Symbicort BIDprn  Home Oxygen: Room air  Acuity Level (RCAT flow sheet): 3  Aerosol Therapy initiated: Continue Duoneb QID with albuterol Q6 hours prn.  Stopped Duoneb Q15 min prn.      Pulmonary Hygiene initiated: Deep breath and cough TID      Volume Expansion initiated: IS TID      Current Oxygen Requirements: 2 Lpm NC  Current SpO2: 98%    Re-evaluation date: 3/21/2020    Patient Education: Informed Pt of RCAT.  Gave instruction in deep breathing.      See \"RT Assessments\" flow sheet for patient assessment scoring and Acuity Level Details.             "

## 2020-03-19 VITALS
BODY MASS INDEX: 37.27 KG/M2 | SYSTOLIC BLOOD PRESSURE: 117 MMHG | HEIGHT: 64 IN | DIASTOLIC BLOOD PRESSURE: 92 MMHG | HEART RATE: 99 BPM | RESPIRATION RATE: 20 BRPM | TEMPERATURE: 96 F | OXYGEN SATURATION: 94 % | WEIGHT: 218.3 LBS

## 2020-03-19 LAB
GLUCOSE BLDC GLUCOMTR-MCNC: 129 MG/DL (ref 70–99)
GLUCOSE BLDC GLUCOMTR-MCNC: 153 MG/DL (ref 70–99)

## 2020-03-19 PROCEDURE — 25000132 ZZH RX MED GY IP 250 OP 250 PS 637: Performed by: HOSPITALIST

## 2020-03-19 PROCEDURE — 00000146 ZZHCL STATISTIC GLUCOSE BY METER IP

## 2020-03-19 PROCEDURE — 40000275 ZZH STATISTIC RCP TIME EA 10 MIN

## 2020-03-19 PROCEDURE — 94640 AIRWAY INHALATION TREATMENT: CPT

## 2020-03-19 PROCEDURE — 25000131 ZZH RX MED GY IP 250 OP 636 PS 637: Performed by: HOSPITALIST

## 2020-03-19 PROCEDURE — 25000125 ZZHC RX 250: Performed by: HOSPITALIST

## 2020-03-19 PROCEDURE — 99238 HOSP IP/OBS DSCHRG MGMT 30/<: CPT | Performed by: INTERNAL MEDICINE

## 2020-03-19 RX ORDER — PREDNISONE 10 MG/1
TABLET ORAL
Qty: 15 TABLET | Refills: 0 | Status: SHIPPED | OUTPATIENT
Start: 2020-03-19 | End: 2022-04-10

## 2020-03-19 RX ADMIN — NICOTINE 1 PATCH: 14 PATCH, EXTENDED RELEASE TRANSDERMAL at 07:57

## 2020-03-19 RX ADMIN — IPRATROPIUM BROMIDE AND ALBUTEROL SULFATE 3 ML: .5; 3 SOLUTION RESPIRATORY (INHALATION) at 08:31

## 2020-03-19 RX ADMIN — RISPERIDONE 4 MG: 2 TABLET ORAL at 07:52

## 2020-03-19 RX ADMIN — ESCITALOPRAM OXALATE 10 MG: 10 TABLET ORAL at 07:51

## 2020-03-19 RX ADMIN — ATORVASTATIN CALCIUM 40 MG: 40 TABLET, FILM COATED ORAL at 07:51

## 2020-03-19 RX ADMIN — GABAPENTIN 600 MG: 300 CAPSULE ORAL at 07:51

## 2020-03-19 RX ADMIN — PREDNISONE 40 MG: 20 TABLET ORAL at 07:51

## 2020-03-19 ASSESSMENT — ACTIVITIES OF DAILY LIVING (ADL)
ADLS_ACUITY_SCORE: 13

## 2020-03-19 NOTE — PLAN OF CARE
Patient alert and oriented  Peripheral IV removed  VSS, on room air  Up independently   Denies pain  Tolerating diet, no nausea  Coughing  COVID19 pending, MDRITESH will contact with results  Patient discharging today

## 2020-03-19 NOTE — DISCHARGE SUMMARY
Discharge instructions discussed with patient  Discussed COVID19 isolation at home/MDH will contact with results  Discharge medications given to patient  All questions answered  Patient left floor with mask on, and took all belongings  Spouse transporting home    Called and message left to remind patient to take nicotine patch off hip

## 2020-03-19 NOTE — PLAN OF CARE
Pertinent assessments: LS dim, denies pain, urinating frequently per pt baseline, loose stools, c-diff negative   Major Shift Events: pt sleeping on and off   Treatment Plan: prednisone    Discharge Readiness: Medically active  Expected Discharge Date: likely today 3/19/20  Discharge Disposition: Home with Self care  Barriers/Criteria for discharge: treatment plan

## 2020-03-19 NOTE — DISCHARGE SUMMARY
"[facility]  Hospitalist Discharge Summary       Date of Admission:  3/17/2020  Date of Discharge:  3/19/2020  Discharging Provider: Samuel Coelho MD      Discharge Diagnoses   Acute exacerbation of COPD  Diarrhea  Subsegmental PE    Follow-ups Needed After Discharge   Follow-up Appointments     Follow-up and recommended labs and tests       Follow up with primary care provider, DAQUAN VAN, within 7 days   for hospital follow- up.             Unresulted Labs Ordered in the Past 30 Days of this Admission     Date and Time Order Name Status Description    3/18/2020 1108 COVID-19 Virus (Coronavirus) PCR to [place] [facility] Nasopharyngeal (NP) Swab in [facility] In process       These results will be followed up by MD    Discharge Disposition   Discharged to home  Condition at discharge: Stable    History of Present Illness   Per admission H&P:  \"Marsha Charles is a 49 year old female history of COPD, diabetes, hypercholesterolemia who presents with shortness of breath x10 to 11 days.     Patient symptoms started about 10 to 11 days ago with shortness of breath that seem to be worse with exertion.  She also notes associated fevers and chills.  She also notes increased cough with sputum production.  Sputum has been mainly clear to yellow.  She does note that her  with whom she lives has also been ill with a viral illness over the last several weeks.  She did see an outpatient provider at [facility] approximately 1 week ago and was prescribed a one-week course of prednisone and doxycycline.  She states that this briefly did help her symptoms.  She states that her symptoms seem to worsen over the last couple days.  She presented to the ER for further evaluation.  She denies any chest discomfort with her symptoms.  No unilateral leg swelling.  She does note that she had surgery on her foot in December 2019 and was immobile for quite a while.  She has no recent travel.  No known contacts of " "COVID 19.     In the ED, patient afebrile but tachycardic to 100 and tachypneic 24.  Blood pressures were normal.  She was saturating in the high 80s and low 90s on room air.  Lab work was notable for a normal BMP.  Troponin was negative.  CBC was unremarkable.  Her d-dimer was checked and was positive.  CT PE was performed and showed a tiny burden of bilateral acute PE.  Also showed groundglass opacity in the left upper lung and airway thickening.  Lower extremity Dopplers were performed and negative for DVT.  Does started on prednisone, DuoNeb, ceftriaxone and heparin drip.\"    Hospital Course     Acute hypoxic respiratory failure secondary to exacerbation of COPD  Signs/symptoms consistent with exacerbation of COPD with increasing cough and shortness of breath.  Patient had also reported fever up to 102  F in the outpatient setting.  She was initially started on ceftriaxone, which was discontinued during the hospital stay with a negative procalcitonin.  She briefly required 2 L of oxygen to maintain saturations which was weaned to room air.  Given her reports of progressive dyspnea on exertion, echocardiogram was checked and normal.  Due to her reported fever, patient was tested for COVID-19 which was pending at time of discharge.  She will be discharged on a prednisone taper.  She was instructed to maintain strict isolation precautions until she gets the test results back.    Subsegmental pulmonary embolism  Noted on CT PE protocol performed in the ED and reviewed with radiology, only 1 tiny subsegmental pulmonary embolism was seen.  This is unlikely to be related to her presenting symptoms and given its small size, provoked nature by surgery in late 2019, low likelihood for recurrence, the benefit of anticoagulation does not outweigh its risks and heparin drip was stopped.  She will not be discharged on any anticoagulation.    Diarrhea  Patient reported diarrhea since starting doxycycline, C. difficile was " checked and negative.     Consultations This Hospital Stay   PHARMACY TO DOSE HEPARIN  PHARMACY TO DOSE HEPARIN  PHARMACY LIAISON FOR MEDICATION COVERAGE CONSULT  PHARMACY DISCHARGE EDUCATION BY PHARMACIST      Code Status   Full Code    Time Spent on this Encounter   I, Samuel Coelho MD, personally saw the patient today and spent less than or equal to 30 minutes discharging this patient.       Samuel Coelho MD  [employer]  ______________________________________________________________________    Physical Exam   Vital Signs: Temp: 96  F (35.6  C) Temp src: Oral BP: (!) 117/92   Heart Rate: 97 Resp: 20 SpO2: 94 % O2 Device: None (Room air)    Weight: 218 lbs 4.8 oz      General: Looks well, no distress.    HEENT: No scleral icterus. Oropharynx moist.     Neck: Supple. Normal range of motion.    Pulmonary: Normal work of breathing. Clear to auscultation bilaterally.    Cardiovascular: Regular rate and rhythm without murmur or extra heart sounds.    Abdomen: Soft and non-tender.    Extremities: No peripheral edema. No clubbing.    Neurologic: Awake, alert, appropriate.    Skin: Warm and dry.    Psychiatric: Normal affect and mood.       Primary Care Physician   DAQUAN VAN    Discharge Orders      Reason for your hospital stay    You were admitted for cough and fever. We saw a small area of inflammation in your lungs. Your viral test was negative but the COVID 19 test is still pending. You will be informed of the results over the phone. We saw a very small blood clot in the lungs but this is not causing you any symptoms and we do not need to treat it. We will discharge you on a short prednisone taper.     Follow-up and recommended labs and tests     Follow up with primary care provider, DAQUAN VAN, within 7 days for hospital follow- up.     Activity    Your activity upon discharge: activity as tolerated     Diet    Follow this diet upon discharge: Orders Placed This  Encounter      Combination Diet Regular Diet Adult       Significant Results and Procedures   Most Recent 3 CBC's:  Recent Labs   Lab Test 03/18/20  0727 03/17/20 1933 05/11/17 2129   WBC 7.3 8.0 7.4   HGB 13.3 14.3 14.3   MCV 97 101* 100    310 256     Most Recent 3 BMP's:  Recent Labs   Lab Test 03/18/20  0727 03/17/20 1933 05/11/17 2129    141 141   POTASSIUM 4.3 3.5 4.3   CHLORIDE 105 106 107   CO2 24 32 29   BUN 11 11 6*   CR 0.58 0.68 0.70   ANIONGAP 6 3 5   KEREN 8.8 9.3 9.4   * 143* 120*     Most Recent 2 LFT's:  Recent Labs   Lab Test 03/18/20 0727 05/11/17 2129   AST 9 13   ALT 22 22   ALKPHOS 89 100   BILITOTAL 0.2 0.3     ,   Results for orders placed or performed during the hospital encounter of 03/17/20   XR Chest Port 1 View    Narrative    EXAM: XR CHEST PORT 1 VW  LOCATION: [facility]  DATE/TIME: 3/17/2020 7:44 PM    INDICATION: Cough, shortness of breath  COMPARISON: None.      Impression    IMPRESSION: Degenerative disease. No acute infiltrate. Heart and central vessels unremarkable.   CT Chest Pulmonary Embolism w Contrast    Narrative    EXAM: CT CHEST PULMONARY EMBOLISM W CONTRAST  LOCATION: [facility]  DATE/TIME: 3/17/2020 8:41 PM    INDICATION: PE suspected, intermediate prob, positive D-dimer, shortness of breath, wheezing.  COMPARISON: None.  TECHNIQUE: CT angiogram chest during arterial phase injection IV contrast. 2D and 3D MIP reconstructions were performed by the CT technologist. Dose reduction techniques were used.   CONTRAST: 65mL Isovue-370    FINDINGS:  ANGIOGRAM CHEST: Tiny burden bilateral acute pulmonary emboli. Coronary artery calcification. No aortic aneurysm or dissection. No CT evidence of right heart strain.    LUNGS AND PLEURA: 1.4 x 0.5 cm groundglass opacity left upper lobe on image #51. 5 mm benign-appearing pleural-based nodule along left major fissure on image #158. Scattered fibroatelectasis and airway thickening  most prominent at the lung bases.    MEDIASTINUM/AXILLAE: Normal.    UPPER ABDOMEN: Cholecystectomy.    MUSCULOSKELETAL: Degenerative disease.      Impression    IMPRESSION:  1.  Tiny burden of bilateral acute pulmonary emboli.  2.  1.4 x 0.5 cm groundglass opacity left upper lobe lung with scattered fibroatelectasis and airway thickening. Findings likely infectious or inflammatory. If no prior, follow-up noncontrast CT recommended in 3 months to ensure resolution and exclude   other underlying pathology.    Results called to Flip Del Rio at 9:05 PM.   US Lower Extremity Venous Duplex Bilateral    Narrative    EXAM: US LOWER EXTREMITY VENOUS DUPLEX BILATERAL  LOCATION: [facility]  DATE/TIME: 3/17/2020 9:59 PM    INDICATION: Pulmonary embolus. Evaluate for DVT.  COMPARISON: CT 2020.  TECHNIQUE: Venous Duplex ultrasound of bilateral lower extremities with and without compression, augmentation and duplex. Color flow and spectral Doppler with waveform analysis performed.    FINDINGS: Exam includes the common femoral, femoral, popliteal veins as well as segmentally visualized deep calf veins and greater saphenous vein.     RIGHT: No deep vein thrombosis. No superficial thrombophlebitis. No popliteal cyst.    LEFT: No deep vein thrombosis. No superficial thrombophlebitis. No popliteal cyst.      Impression    IMPRESSION:  1.  No deep venous thrombosis in the bilateral lower extremities.   Echo Limited    Narrative    [id number]  [id number]  [id number]  [id number]^MATT^KAYODE           [facility]  Echocardiography Laboratory  [address] [place] [address]        Name: JULITO OSEGUERA  MRN: [id number]  : 1970  Study Date: 2020 02:48 PM  Age: 49 yrs  Gender: Female  Patient Location: [facility]  Reason For Study: SOB  Ordering Physician: KAYODE GUTIERREZ  Performed By: Tata Dennison     BSA: 2.0 m2  Height: 64 in  Weight: 218 lb  HR: 97  BP: 131/71  mmHg  _____________________________________________________________________________  __        Procedure  Limited Portable Echo Adult.  _____________________________________________________________________________  __        Interpretation Summary     1. Normal left ventricular size and systolic function. LVEF 60-65%.  2. No regional wall motion abnormalities.  3. Normal right ventricular size and systolic function.  4. No significant valve disease.     There is no comparison study available.  _____________________________________________________________________________  __        Left Ventricle  The left ventricle is normal in size. There is normal left ventricular wall  thickness. Left ventricular systolic function is normal. The visual ejection  fraction is estimated at 60-65%. No regional wall motion abnormalities noted.     Right Ventricle  The right ventricle is normal in size and function.     Atria  Normal left atrial size. Right atrial size is normal. There is no color  Doppler evidence of an atrial shunt.     Mitral Valve  The mitral valve leaflets appear normal. There is no evidence of stenosis,  fluttering, or prolapse. There is mild mitral annular calcification. There is  trace mitral regurgitation.        Tricuspid Valve  Normal tricuspid valve. There is trace tricuspid regurgitation. Right  ventricular systolic pressure could not be approximated due to inadequate  tricuspid regurgitation.     Aortic Valve  The aortic valve is not well visualized. There is trace aortic regurgitation.  No hemodynamically significant valvular aortic stenosis.     Pulmonic Valve  The pulmonic valve is not well seen, but is grossly normal. There is trace  pulmonic valvular regurgitation.     Vessels  The aortic root is normal size. Normal size ascending aorta. Dilation of the  inferior vena cava is present with normal respiratory variation in diameter.     Pericardium  There is no pericardial effusion.        Rhythm  Sinus  rhythm was noted.  _____________________________________________________________________________  __                                Report approved by: Dr Adrian Ocasio 03/18/2020 03:29 PM                    _____________________________________________________________________________  __            Discharge Medications   Current Discharge Medication List      START taking these medications    Details   predniSONE (DELTASONE) 10 MG tablet Take 4 tabs (40 mg) for 2 days, then 2 tabs for 2 days, then 1 tab for 2 days, then 1/2 tab for 2 days, then stop.  Qty: 15 tablet, Refills: 0    Associated Diagnoses: COPD with chronic bronchitis (H)         CONTINUE these medications which have NOT CHANGED    Details   Acetaminophen (TYLENOL PO) Take 1,000 mg by mouth every 6 hours as needed for mild pain or fever      albuterol (PROAIR HFA/PROVENTIL HFA/VENTOLIN HFA) 108 (90 BASE) MCG/ACT Inhaler Inhale 2 puffs into the lungs every 4 hours as needed for shortness of breath / dyspnea or wheezing      atorvastatin (LIPITOR) 40 MG tablet Take 40 mg by mouth daily       budesonide-formoterol (SYMBICORT) 80-4.5 MCG/ACT Inhaler Inhale 2 puffs into the lungs daily as needed      escitalopram (LEXAPRO) 10 MG tablet Take 10 mg by mouth daily      gabapentin (NEURONTIN) 600 MG tablet Take 600 mg by mouth 3 times daily       ibuprofen (ADVIL/MOTRIN) 200 MG tablet Take 400 mg by mouth every 6 hours as needed for mild pain      metFORMIN (GLUCOPHAGE) 1000 MG tablet Take 1,000 mg by mouth daily (with breakfast)       risperiDONE (RISPERDAL) 4 MG tablet Take 4 mg by mouth daily      SUMAtriptan (IMITREX) 100 MG tablet Take 100 mg by mouth every 2 hours as needed for migraine Max 200mg per 24 hours.      Varenicline Tartrate (CHANTIX PO) Take 1 mg by mouth 2 times daily          STOP taking these medications       doxycycline hyclate (VIBRA-TABS) 100 MG tablet Comments:   Reason for Stopping:             Allergies   No Known Allergies

## 2020-03-20 ENCOUNTER — TELEPHONE (OUTPATIENT)
Dept: EMERGENCY MEDICINE | Facility: CLINIC | Age: 50
End: 2020-03-20

## 2020-03-20 NOTE — TELEPHONE ENCOUNTER
Coronavirus (COVID-19) Notification    Reason for call  Notify of Negative COVID-19 lab result, assess symptoms,  review [facility] recommendations    Lab Result    Lab test 2019-nCoV rRt-PCR  Oropharyngeal AND/OR nasopharyngeal swabs were NEGATIVE for 2019-nCoV RNA      RN Recommendations/Instructions per [facility]  Patient notified of Negative COVID-19.    Patient can discontinue Quarantee and is free to resume normal activites.  If Patient has questions that you are not able to answer they can contact PCP or MD hotline ([phone])    Please Contact your PCP clinic or return to the Emergency department if your:    Symptoms worsen or other concerning symptom's.      [RN/LPN Name]  Tammie Severson, LPN

## 2020-03-21 LAB
COVID-19 VIRUS PCR RESULT FROM MDH: NEGATIVE
LAB SCANNED RESULT: NORMAL

## 2020-11-14 ENCOUNTER — HEALTH MAINTENANCE LETTER (OUTPATIENT)
Age: 50
End: 2020-11-14

## 2021-04-03 ENCOUNTER — HEALTH MAINTENANCE LETTER (OUTPATIENT)
Age: 51
End: 2021-04-03

## 2021-07-18 ENCOUNTER — HEALTH MAINTENANCE LETTER (OUTPATIENT)
Age: 51
End: 2021-07-18

## 2021-09-12 ENCOUNTER — HEALTH MAINTENANCE LETTER (OUTPATIENT)
Age: 51
End: 2021-09-12

## 2021-11-07 ENCOUNTER — HEALTH MAINTENANCE LETTER (OUTPATIENT)
Age: 51
End: 2021-11-07

## 2021-12-13 NOTE — PROGRESS NOTES
[facility]    Medicine Progress Note - Hospitalist Service       Date of Admission:  3/17/2020  Length of stay: 1 days    Assessment & Plan   Marsha Charles is a 49 year old female with history of tobacco abuse, COPD, diabetes who is admitted to the hospitalist service for acute hypoxic respiratory failure secondary to COPD exacerbation.    Patient reported 7 to 10 days of cough, shortness of breath and fever up to 102 Fahrenheit at home.  She was treated with prednisone and doxycycline in the outpatient setting and reports she did not improve.  For this reason, she presented here and was requiring 1 to 2 L to maintain oxygen saturations.  Initial work-up was notable for a CT PE protocol which showed bilateral tiny PEs and a left upper lobe groundglass nodule.  Patient was started on prednisone, nebs, ceftriaxone and heparin drip.    Today:  - Patient has developed diarrhea in the past week while on the doxycycline and C. difficile will be checked.  - Reviewed the CT scan from last night with the radiologist today.   in regards to PEs, the radiologist could only see one very tiny subsegmental PE.  This does not merit anticoagulation per Chest guidelines citation. Stop heparin drip.  - Given fever, cough and shortness of breath over past week have ordered COVID-19 testing, though this is very unlikely to be positive given the absence of bilateral infiltrates on chest CT.  - She was able to be weaned to room air during the interview.  Likely discharge to home tomorrow.    Acute hypoxic respiratory failure secondary to COPD exacerbation  Presented with 10 days of cough, shortness of breath and reported fever.  Had ill contact with her  who had similar illness.  Reports that her temperature was as high as 102  F at home.  CT scan on admission showed left upper lobe groundglass nodule, bilateral basilar atelectasis.  Started on ceftriaxone initially which has been stopped given negative pro  "calcitonin.  Treating with prednisone and duo nebs.  Weaning O2 as able.     Subsegmental PE  CT pulmonary angiogram is high sensitivity for subsegmental PE, and in this patient with other reason to be short of breath, feel that this is not likely contributing to her symptoms. She is not at risk for recurrent DVT/PE, feel that it is appropriate to stop heparin drip.     Diarrhea  With the recent antibiotic use, will check C diff.    DM2 with hyperglycemia from steroids  Continue metformin.  Add sliding scale insulin.    Chronic Medical Conditions  Hyperlipidemia: Continue home atorvastatin  Tobacco use disorder: Tobacco cessation advised.  Patch is ordered.  Major depressive disorder with psychotic features: Continue home risperidone and escitalopram    Diet: Regular  DVT Prophylaxis: Low Risk/Ambulatory with no VTE prophylaxis indicated  Malnutrition: No  Cheema Catheter: No  Code Status: Full Code     Disposition Plan   Expected discharge:   To home tomorrow.     Samuel Coelho MD  Hospitalist Service  [facility]  ______________________________________________________________________    Interval History   Patient had 5 diarrhea stools this morning. Reports feeling about the same as when she came in. Coughing occasionally. Has not a had a fever during the hospital stay.     Data reviewed today: I reviewed all medications, new labs and imaging results over the last 24 hours.     Physical Exam   /71 (BP Location: Right arm)   Pulse 99   Temp 96.6  F (35.9  C) (Oral)   Resp 18   Ht 1.626 m (5' 4\")   Wt 99 kg (218 lb 4.8 oz)   SpO2 95%   BMI 37.47 kg/m    218 lbs 4.8 oz       General: Sitting up on the edge of the bed, breathing comfortably.     HEENT: No scleral icterus. Oropharynx moist.     Neck: Supple. Normal range of motion.     Pulmonary: Normal work of breathing. Breath sounds diminished bilaterally, no wheeze heard.     Cardiovascular: Regular rate and rhythm without murmur or " extra heart sounds.    Abdomen: Soft and non-tender.    Extremities: No peripheral edema. No clubbing or cyanosis.     Neurologic: Awake, alert, appropriate.    Skin: Warm and dry.    Psychiatric: Normal affect and mood.     Data    Recent Labs   Lab 03/18/20  0727 03/17/20  1933   WBC 7.3 8.0   HGB 13.3 14.3   MCV 97 101*    310    141   POTASSIUM 4.3 3.5   CHLORIDE 105 106   CO2 24 32   BUN 11 11   CR 0.58 0.68   ANIONGAP 6 3   KEREN 8.8 9.3   * 143*   ALBUMIN 3.2*  --    PROTTOTAL 6.6*  --    BILITOTAL 0.2  --    ALKPHOS 89  --    ALT 22  --    AST 9  --    TROPI  --  <0.015       Medications      Current Facility-Administered Medications   Medication Dose Route Frequency     atorvastatin  40 mg Oral Daily     cefTRIAXone  1 g Intravenous Q24H     escitalopram  10 mg Oral Daily     gabapentin  600 mg Oral TID     insulin aspart  1-7 Units Subcutaneous TID AC     insulin aspart  1-5 Units Subcutaneous At Bedtime     ipratropium - albuterol 0.5 mg/2.5 mg/3 mL  3 mL Nebulization 4x daily     metFORMIN  1,000 mg Oral Daily with supper     nicotine  1 patch Transdermal Daily     nicotine   Transdermal Q8H     predniSONE  40 mg Oral Daily     risperiDONE  4 mg Oral Daily     sodium chloride (PF)  3 mL Intracatheter Q8H        no

## 2022-01-02 ENCOUNTER — HEALTH MAINTENANCE LETTER (OUTPATIENT)
Age: 52
End: 2022-01-02

## 2022-02-27 ENCOUNTER — HEALTH MAINTENANCE LETTER (OUTPATIENT)
Age: 52
End: 2022-02-27

## 2022-04-10 ENCOUNTER — HOSPITAL ENCOUNTER (EMERGENCY)
Facility: CLINIC | Age: 52
Discharge: HOME | End: 2022-04-11
Attending: EMERGENCY MEDICINE | Admitting: EMERGENCY MEDICINE
Payer: COMMERCIAL

## 2022-04-10 ENCOUNTER — APPOINTMENT (OUTPATIENT)
Dept: CT IMAGING | Facility: CLINIC | Age: 52
End: 2022-04-10
Attending: EMERGENCY MEDICINE
Payer: COMMERCIAL

## 2022-04-10 DIAGNOSIS — S22.31XG CLOSED FRACTURE OF ONE RIB OF RIGHT SIDE WITH DELAYED HEALING, SUBSEQUENT ENCOUNTER: ICD-10-CM

## 2022-04-10 DIAGNOSIS — R10.9 RIGHT FLANK PAIN: ICD-10-CM

## 2022-04-10 LAB
ALBUMIN SERPL-MCNC: 3.6 G/DL (ref 3.4–5)
ALP SERPL-CCNC: 113 U/L (ref 40–150)
ALT SERPL W P-5'-P-CCNC: 32 U/L (ref 0–50)
ANION GAP SERPL CALCULATED.3IONS-SCNC: 2 MMOL/L (ref 3–14)
AST SERPL W P-5'-P-CCNC: 21 U/L (ref 0–45)
BASE EXCESS BLDV CALC-SCNC: 4.4 MMOL/L (ref -7.7–1.9)
BILIRUB SERPL-MCNC: 0.2 MG/DL (ref 0.2–1.3)
BUN SERPL-MCNC: 10 MG/DL (ref 7–30)
CALCIUM SERPL-MCNC: 8.9 MG/DL (ref 8.5–10.1)
CHLORIDE BLD-SCNC: 108 MMOL/L (ref 94–109)
CO2 SERPL-SCNC: 29 MMOL/L (ref 20–32)
CREAT BLD-MCNC: 0.7 MG/DL (ref 0.5–1)
CREAT SERPL-MCNC: 0.67 MG/DL (ref 0.52–1.04)
D DIMER PPP FEU-MCNC: 0.64 UG/ML FEU (ref 0–0.5)
GFR SERPL CREATININE-BSD FRML MDRD: >60 ML/MIN/1.73M2
GFR SERPL CREATININE-BSD FRML MDRD: >90 ML/MIN/1.73M2
GLUCOSE BLD-MCNC: 157 MG/DL (ref 70–99)
HCO3 BLDV-SCNC: 30 MMOL/L (ref 21–28)
LIPASE SERPL-CCNC: 94 U/L (ref 73–393)
NT-PROBNP SERPL-MCNC: 65 PG/ML (ref 0–900)
O2/TOTAL GAS SETTING VFR VENT: 21 %
PCO2 BLDV: 46 MM HG (ref 40–50)
PH BLDV: 7.42 [PH] (ref 7.32–7.43)
PO2 BLDV: 32 MM HG (ref 25–47)
POTASSIUM BLD-SCNC: 4.4 MMOL/L (ref 3.4–5.3)
PROT SERPL-MCNC: 7.5 G/DL (ref 6.8–8.8)
SODIUM SERPL-SCNC: 139 MMOL/L (ref 133–144)
TROPONIN I SERPL HS-MCNC: <3 NG/L

## 2022-04-10 PROCEDURE — C9803 HOPD COVID-19 SPEC COLLECT: HCPCS

## 2022-04-10 PROCEDURE — 82565 ASSAY OF CREATININE: CPT

## 2022-04-10 PROCEDURE — 99285 EMERGENCY DEPT VISIT HI MDM: CPT | Mod: 25

## 2022-04-10 PROCEDURE — 96374 THER/PROPH/DIAG INJ IV PUSH: CPT | Mod: 59

## 2022-04-10 PROCEDURE — 85379 FIBRIN DEGRADATION QUANT: CPT | Performed by: EMERGENCY MEDICINE

## 2022-04-10 PROCEDURE — 74177 CT ABD & PELVIS W/CONTRAST: CPT

## 2022-04-10 PROCEDURE — 93005 ELECTROCARDIOGRAM TRACING: CPT

## 2022-04-10 PROCEDURE — 80053 COMPREHEN METABOLIC PANEL: CPT | Performed by: EMERGENCY MEDICINE

## 2022-04-10 PROCEDURE — 87637 SARSCOV2&INF A&B&RSV AMP PRB: CPT | Performed by: EMERGENCY MEDICINE

## 2022-04-10 PROCEDURE — 85025 COMPLETE CBC W/AUTO DIFF WBC: CPT | Performed by: EMERGENCY MEDICINE

## 2022-04-10 PROCEDURE — 250N000011 HC RX IP 250 OP 636: Performed by: EMERGENCY MEDICINE

## 2022-04-10 PROCEDURE — 84484 ASSAY OF TROPONIN QUANT: CPT | Performed by: EMERGENCY MEDICINE

## 2022-04-10 PROCEDURE — 83690 ASSAY OF LIPASE: CPT | Performed by: EMERGENCY MEDICINE

## 2022-04-10 PROCEDURE — 83880 ASSAY OF NATRIURETIC PEPTIDE: CPT | Performed by: EMERGENCY MEDICINE

## 2022-04-10 PROCEDURE — 82803 BLOOD GASES ANY COMBINATION: CPT | Performed by: EMERGENCY MEDICINE

## 2022-04-10 PROCEDURE — 36415 COLL VENOUS BLD VENIPUNCTURE: CPT | Performed by: EMERGENCY MEDICINE

## 2022-04-10 RX ORDER — IOPAMIDOL 755 MG/ML
500 INJECTION, SOLUTION INTRAVASCULAR ONCE
Status: COMPLETED | OUTPATIENT
Start: 2022-04-11 | End: 2022-04-11

## 2022-04-10 RX ORDER — MORPHINE SULFATE 4 MG/ML
4 INJECTION, SOLUTION INTRAMUSCULAR; INTRAVENOUS ONCE
Status: COMPLETED | OUTPATIENT
Start: 2022-04-10 | End: 2022-04-10

## 2022-04-10 RX ORDER — LIDOCAINE 40 MG/G
CREAM TOPICAL
Status: DISCONTINUED | OUTPATIENT
Start: 2022-04-10 | End: 2022-04-11 | Stop reason: HOSPADM

## 2022-04-10 RX ADMIN — MORPHINE SULFATE 4 MG: 4 INJECTION INTRAVENOUS at 22:52

## 2022-04-10 ASSESSMENT — ENCOUNTER SYMPTOMS
ABDOMINAL PAIN: 1
VOMITING: 0
SHORTNESS OF BREATH: 1
BACK PAIN: 1
NAUSEA: 0

## 2022-04-11 VITALS
RESPIRATION RATE: 20 BRPM | OXYGEN SATURATION: 95 % | TEMPERATURE: 97.7 F | SYSTOLIC BLOOD PRESSURE: 124 MMHG | HEART RATE: 96 BPM | DIASTOLIC BLOOD PRESSURE: 76 MMHG

## 2022-04-11 LAB
ATRIAL RATE - MUSE: 102 BPM
BASOPHILS # BLD AUTO: 0.1 10E3/UL (ref 0–0.2)
BASOPHILS NFR BLD AUTO: 1 %
DIASTOLIC BLOOD PRESSURE - MUSE: NORMAL MMHG
EOSINOPHIL # BLD AUTO: 0.1 10E3/UL (ref 0–0.7)
EOSINOPHIL NFR BLD AUTO: 2 %
ERYTHROCYTE [DISTWIDTH] IN BLOOD BY AUTOMATED COUNT: 12.7 % (ref 10–15)
FLUAV RNA SPEC QL NAA+PROBE: NEGATIVE
FLUBV RNA RESP QL NAA+PROBE: NEGATIVE
HCT VFR BLD AUTO: 46.1 % (ref 35–47)
HGB BLD-MCNC: 15.2 G/DL (ref 11.7–15.7)
HOLD SPECIMEN: NORMAL
HOLD SPECIMEN: NORMAL
IMM GRANULOCYTES # BLD: 0 10E3/UL
IMM GRANULOCYTES NFR BLD: 0 %
INTERPRETATION ECG - MUSE: NORMAL
LYMPHOCYTES # BLD AUTO: 3.6 10E3/UL (ref 0.8–5.3)
LYMPHOCYTES NFR BLD AUTO: 43 %
MCH RBC QN AUTO: 33.7 PG (ref 26.5–33)
MCHC RBC AUTO-ENTMCNC: 33 G/DL (ref 31.5–36.5)
MCV RBC AUTO: 102 FL (ref 78–100)
MONOCYTES # BLD AUTO: 0.5 10E3/UL (ref 0–1.3)
MONOCYTES NFR BLD AUTO: 6 %
NEUTROPHILS # BLD AUTO: 4.1 10E3/UL (ref 1.6–8.3)
NEUTROPHILS NFR BLD AUTO: 48 %
NRBC # BLD AUTO: 0 10E3/UL
NRBC BLD AUTO-RTO: 0 /100
P AXIS - MUSE: 40 DEGREES
PLAT MORPH BLD: ABNORMAL
PLATELET # BLD AUTO: 235 10E3/UL (ref 150–450)
PR INTERVAL - MUSE: 122 MS
QRS DURATION - MUSE: 74 MS
QT - MUSE: 364 MS
QTC - MUSE: 474 MS
R AXIS - MUSE: 82 DEGREES
RBC # BLD AUTO: 4.51 10E6/UL (ref 3.8–5.2)
RBC MORPH BLD: ABNORMAL
RSV RNA SPEC NAA+PROBE: NEGATIVE
SARS-COV-2 RNA RESP QL NAA+PROBE: NEGATIVE
SYSTOLIC BLOOD PRESSURE - MUSE: NORMAL MMHG
T AXIS - MUSE: 40 DEGREES
VARIANT LYMPHS BLD QL SMEAR: PRESENT
VENTRICULAR RATE- MUSE: 102 BPM
WBC # BLD AUTO: 8.3 10E3/UL (ref 4–11)

## 2022-04-11 PROCEDURE — 250N000009 HC RX 250: Performed by: EMERGENCY MEDICINE

## 2022-04-11 PROCEDURE — 250N000011 HC RX IP 250 OP 636: Performed by: EMERGENCY MEDICINE

## 2022-04-11 RX ADMIN — IOPAMIDOL 90 ML: 755 INJECTION, SOLUTION INTRAVENOUS at 00:04

## 2022-04-11 RX ADMIN — SODIUM CHLORIDE 89 ML: 9 INJECTION, SOLUTION INTRAVENOUS at 00:04

## 2022-04-11 NOTE — ED TRIAGE NOTES
Pt states RUQ abdominal pain radiating to back for over one week, worse tonight. Pt states pain significantly worse after eating tonight. ABCs intact GCS 15

## 2022-04-11 NOTE — ED PROVIDER NOTES
History     Chief Complaint:  Abdominal Pain     The history is provided by the patient.      Marsha Charles is a 51 year old female with history of tobacco use disorder who presents with abdominal pain. Marsha reports to having pneumonia about a month ago with shortness of breath. For the past week Marsha reports to having RUQ abdominal pain. Pain is reported to spread diffusely throughout the abdomen and back. Deep inhalation and movement worsen pain. Pain worse in the morning and evening. Tonight at dinner she reached down to grab something with severe increase in pain with shortness of breath. She did break a rib on the right in January.  She took three Advil for this before coming into the ED tonight. No nausea or vomiting.     Review of Systems   Respiratory: Positive for shortness of breath.    Gastrointestinal: Positive for abdominal pain. Negative for nausea and vomiting.   Musculoskeletal: Positive for back pain.   All other systems reviewed and are negative.    Allergies:  No Known Allergies    Medications:  Albuterol   Atorvastatin   Budesonide-formoterol   escitalopram   Gabapentin   Metformin    Risperidone    Sumatriptan    Varenicline Tartrate     Past Medical History:     COPD  Diabetes mellitus, type II  Hypercholesterolemia  Obesity  Mixed incontinence  Asthma  Depression    Past Surgical History:    C section, low traverse  Cholecystectomy  Dilation and curettage  Endoscopic release carpal tunnel  Hysterectomy, vaginal  Sling transpubic without anterior colporrhaphy     Social History:  Patient unaccompanied  PCP: Keith Bills     Physical Exam     Patient Vitals for the past 24 hrs:   BP Temp Temp src Pulse Resp SpO2   04/11/22 0045 124/76 -- -- 96 -- 95 %   04/11/22 0030 127/73 -- -- 96 -- 94 %   04/11/22 0015 131/80 -- -- 102 -- --   04/10/22 2345 124/79 -- -- 105 -- 94 %   04/10/22 2330 125/81 -- -- 103 -- 94 %   04/10/22 2315 127/78 -- -- 105 -- 96 %   04/10/22 2300 110/78 -- -- 102 --  94 %   04/10/22 2245 -- -- -- -- -- 97 %   04/10/22 2230 126/95 -- -- -- -- --   04/10/22 2217 132/97 97.7  F (36.5  C) Oral 114 20 96 %     Physical Exam  Nursing note and vitals reviewed.  Constitutional: Cooperative.   HENT:   Mouth/Throat: Mucous membranes are normal.   Cardiovascular: tachycardic, regular rhythm and normal heart sounds.  No murmur.  Pulmonary/Chest: Tachypneic. Increased work of breathing. No respiratory distress. No wheezes. No rales. No edema in the legs  Abdominal: Soft. Normal appearance and bowel sounds are normal. No distension. There is no tenderness.   Neurological: Alert. Oriented x4. Strength.   Skin: Skin is warm and dry.   Psychiatric: Normal mood and affect.      Emergency Department Course     ECG  ECG taken at 2311, ECG read at 2311  Sinus tachycardia.    Agree with computer interpretation  Rate 102 bpm. ND interval 122 ms. QRS duration 74 ms. QT/QTc 364/474 ms. P-R-T axes 40 82 40.     Imaging:  CT Chest (PE) Abdomen Pelvis w Contrast   Preliminary Result   IMPRESSION:    1. Nondisplaced recent appearing fracture of the lateral aspect of the right seventh rib.   2. No other acute abnormality identified in the chest, abdomen or pelvis.   3. No visualized pulmonary embolus.   4. Colonic diverticulosis without evidence of diverticulitis.         Report per radiology    Laboratory:  Labs Ordered and Resulted from Time of ED Arrival to Time of ED Departure   COMPREHENSIVE METABOLIC PANEL - Abnormal       Result Value    Sodium 139      Potassium 4.4      Chloride 108      Carbon Dioxide (CO2) 29      Anion Gap 2 (*)     Urea Nitrogen 10      Creatinine 0.67      Calcium 8.9      Glucose 157 (*)     Alkaline Phosphatase 113      AST 21      ALT 32      Protein Total 7.5      Albumin 3.6      Bilirubin Total 0.2      GFR Estimate >90     D DIMER QUANTITATIVE - Abnormal    D-Dimer Quantitative 0.64 (*)    BLOOD GAS VENOUS - Abnormal    pH Venous 7.42      pCO2 Venous 46      pO2 Venous  32      Bicarbonate Venous 30 (*)     Base Excess/Deficit (+/-) 4.4 (*)     FIO2 21     CBC WITH PLATELETS AND DIFFERENTIAL - Abnormal    WBC Count 8.3      RBC Count 4.51      Hemoglobin 15.2      Hematocrit 46.1       (*)     MCH 33.7 (*)     MCHC 33.0      RDW 12.7      Platelet Count 235      % Neutrophils 48      % Lymphocytes 43      % Monocytes 6      % Eosinophils 2      % Basophils 1      % Immature Granulocytes 0      NRBCs per 100 WBC 0      Absolute Neutrophils 4.1      Absolute Lymphocytes 3.6      Absolute Monocytes 0.5      Absolute Eosinophils 0.1      Absolute Basophils 0.1      Absolute Immature Granulocytes 0.0      Absolute NRBCs 0.0     RBC AND PLATELET MORPHOLOGY - Abnormal    Platelet Assessment        Value: Automated Count Confirmed. Platelet morphology is normal.    Reactive Lymphocytes Present (*)     RBC Morphology Confirmed RBC Indices     LIPASE - Normal    Lipase 94     TROPONIN I - Normal    Troponin I High Sensitivity <3     NT PROBNP INPATIENT - Normal    N terminal Pro BNP Inpatient 65     INFLUENZA A/B & SARS-COV2 PCR MULTIPLEX - Normal    Influenza A PCR Negative      Influenza B PCR Negative      RSV PCR Negative      SARS CoV2 PCR Negative     ISTAT CREATININE POCT - Normal    Creatinine POCT 0.7      GFR, ESTIMATED POCT >60        Reviewed:  I reviewed nursing notes, vitals, past medical history and Care Everywhere    Assessments/Consults:  ED Course as of 04/11/22 0100   Sun Apr 10, 2022   2231 Obtained history and examined the patient as noted above.    Mon Apr 11, 2022   0045 Rechecked the patient and explained findings.       Interventions:  2252 Morphine 4 mg, IV    Disposition:  The patient was discharged to home.     Impression & Plan     Medical Decision Making:  Marsha Charles is a 51 year old female who presents to the ED with right flank pain. She has been struggling since January with a right rib fracture. She had a large workup initiated but no other cause  for her pain has been identified. Specifically there is no pneumonia, pleural effusion, PE, or intraabdominal pathology. She has already had her gallbladder removed. No skin findings to be concerned for herpes zoster. I do suspect that she has some neuropathic pain from the rib fracture the way she describes it radiating around her flank. I recommended she follow up with her primary care physician for pain care and management strategies. At this time she also had complaints of a bulge in her right upper quadrant of her right abdomen. I am not able to appreciate any bulge on my exam. Patient exam revealed no hernia or other explanation. I reviewed imaging with the patient in the room.  She will be discharge home in stable condition.     Diagnosis:    ICD-10-CM    1. Right flank pain  R10.9    2. Closed fracture of one rib of right side with delayed healing, subsequent encounter  S22.31XG      Scribe Disclosure:  I, Isaac Ruth, am serving as a scribe at 10:27 PM on 4/10/2022 to document services personally performed by Stanislav Cabello MD based on my observations and the provider's statements to me.           Stanislav Cabello MD  04/11/22 0201

## 2022-04-13 ENCOUNTER — TELEPHONE (OUTPATIENT)
Dept: FAMILY MEDICINE | Facility: CLINIC | Age: 52
End: 2022-04-13
Payer: COMMERCIAL

## 2022-04-13 ENCOUNTER — VIRTUAL VISIT (OUTPATIENT)
Dept: FAMILY MEDICINE | Facility: CLINIC | Age: 52
End: 2022-04-13
Payer: COMMERCIAL

## 2022-04-13 DIAGNOSIS — S22.41XD CLOSED FRACTURE OF MULTIPLE RIBS OF RIGHT SIDE WITH ROUTINE HEALING, SUBSEQUENT ENCOUNTER: Primary | ICD-10-CM

## 2022-04-13 DIAGNOSIS — R05.9 COUGH: ICD-10-CM

## 2022-04-13 DIAGNOSIS — J44.9 CHRONIC OBSTRUCTIVE PULMONARY DISEASE, UNSPECIFIED COPD TYPE (H): ICD-10-CM

## 2022-04-13 PROCEDURE — 99214 OFFICE O/P EST MOD 30 MIN: CPT | Mod: 95 | Performed by: FAMILY MEDICINE

## 2022-04-13 RX ORDER — IPRATROPIUM BROMIDE AND ALBUTEROL SULFATE 2.5; .5 MG/3ML; MG/3ML
1 SOLUTION RESPIRATORY (INHALATION) EVERY 6 HOURS PRN
Qty: 90 ML | Refills: 3 | Status: ON HOLD | OUTPATIENT
Start: 2022-04-13 | End: 2023-10-31

## 2022-04-13 RX ORDER — CYCLOBENZAPRINE HCL 10 MG
10 TABLET ORAL 3 TIMES DAILY PRN
Qty: 30 TABLET | Refills: 1 | Status: SHIPPED | OUTPATIENT
Start: 2022-04-13 | End: 2022-04-14

## 2022-04-13 RX ORDER — BENZONATATE 200 MG/1
200 CAPSULE ORAL 3 TIMES DAILY PRN
Qty: 30 CAPSULE | Refills: 1 | Status: ON HOLD | OUTPATIENT
Start: 2022-04-13 | End: 2023-10-31

## 2022-04-13 NOTE — TELEPHONE ENCOUNTER
Visit Disposition    Dispositions Check-out Note   0 Return in about 1 month (around 5/13/2022) for preventative care visit establish with other provider. Schedule preventative visit      Tried to call patient, no answer and VM not set up- please help schedule as stated above    Staci Lockwood/

## 2022-04-13 NOTE — PROGRESS NOTES
Marsha is a 51 year old who is being evaluated via a billable video visit.      How would you like to obtain your AVS? IntooBRhart  If the video visit is dropped, the invitation should be resent by: Text to cell phone: [phone]  Will anyone else be joining your video visit? No      Video Start Time: 1:40 PM    Assessment & Plan     Closed fracture of multiple ribs of right side with routine healing, subsequent encounter  Fracture of 2 different ribs at the seventh and eighth ribs at different times over the last few months from coughing.  We will treat acute pain of the recent appearing fracture of the seventh rib.  Recommended DEXA scan for assessment of bone density with likely fragility type fracture given multiple rib fractures from coughing.  Patient does not want me to order that at this time and would like to talk to a primary care doctor after establishing care with them.  Recommend taking vitamin D.  Recommend smoking cessation.    Cough  - benzonatate (TESSALON) 200 MG capsule; Take 1 capsule (200 mg) by mouth 3 times daily as needed for cough    Chronic obstructive pulmonary disease, unspecified COPD type (H)  Continue Symbicort.  Discussed importance of smoking cessation.  - ipratropium - albuterol 0.5 mg/2.5 mg/3 mL (DUONEB) 0.5-2.5 (3) MG/3ML neb solution; Take 1 vial (3 mLs) by nebulization every 6 hours as needed for shortness of breath / dyspnea or wheezing    Recommend breast cancer screening which patient does not want to set up at this time.    Recommend colon cancer screening which patient does not want to set up this time.    Attempted to have her schedule preventative care visit at conclusion of this visit which she did not set up at this time.             Tobacco Cessation:   reports that she has been smoking cigarettes. She has been smoking about 1.00 pack per day. She has never used smokeless tobacco.  Tobacco Cessation Action Plan: Information offered: Patient not interested at this  time        Return in about 1 month (around 5/13/2022) for preventative care visit establish with other provider.    Ronal López MD  [facility]    Subjective   Marsha is a 51 year old who presents for the following health issues     HPI     ED/UC Followup:    Facility:  [facility]   Date of visit: 4/10/22  Reason for visit: Right flank pain  Current Status: Flank pain is the same. Worse at night, tighter feeling. Sleeping on the couch. Using heat everyday.  This has been going on sine January when she broke a rib.  ER told her to go to the PCP for pain management.      Patient calling to discuss pain control and with symptom control with coughing with recent rib fracture.  Patient was seen in ER and found to have rib fracture at the seventh right rib.  Has prior rib fracture from prior COPD exacerbation in February at the eighth rib.  Reviewed CT scan results with patient on phone.    Review of Systems         Objective         Vitals:  No vitals were obtained today due to virtual visit.    Physical Exam   GENERAL: Healthy, alert and no distress  EYES: Eyes grossly normal to inspection.  No discharge or erythema, or obvious scleral/conjunctival abnormalities.  RESP: No audible wheeze, cough, or visible cyanosis.  No visible retractions or increased work of breathing.    SKIN: Visible skin clear. No significant rash, abnormal pigmentation or lesions.  NEURO: Cranial nerves grossly intact.  Mentation and speech appropriate for age.  PSYCH: Mentation appears normal, affect normal/bright, judgement and insight intact, normal speech and appearance well-groomed.                Video-Visit Details    Type of service:  Video Visit    Video End Time:2:00 PM    Originating Location (pt. Location): Home    Distant Location (provider location):  [facility]     Platform used for Video Visit: [employer]andraWell

## 2022-04-14 ENCOUNTER — TELEPHONE (OUTPATIENT)
Dept: FAMILY MEDICINE | Facility: CLINIC | Age: 52
End: 2022-04-14
Payer: COMMERCIAL

## 2022-04-14 DIAGNOSIS — S22.41XD CLOSED FRACTURE OF MULTIPLE RIBS OF RIGHT SIDE WITH ROUTINE HEALING, SUBSEQUENT ENCOUNTER: Primary | ICD-10-CM

## 2022-04-14 RX ORDER — HYDROCODONE BITARTRATE AND ACETAMINOPHEN 5; 325 MG/1; MG/1
1 TABLET ORAL EVERY 6 HOURS PRN
Qty: 18 TABLET | Refills: 0 | Status: SHIPPED | OUTPATIENT
Start: 2022-04-14 | End: 2022-04-17

## 2022-04-14 NOTE — TELEPHONE ENCOUNTER
Patient had a VV with Dr López yesterday for Pain Management, patient was prescribed Flexeril and states that it didn't do anything for her, that it should have kicked in after 30 minutes of taking it.     Patient called and got [facility] Triage line, refused to talk to them, would only talk to someone on Dr López's team. Call was sent to TC who took the call and patient was very rude on the call- patient kept asking if I was on the team of Dr López, and what my title was.     Kindly answered patients questions, letting her know I do work with Dr López in the [facility] clinic, and patient proceeded to ask me what my first and last name was. Told her that my last name is irrelevant to her and she didn't need to know.     Told patient I would pass along her message to Dr López, about finding a different medication for her for     Staci Lockwood/

## 2022-06-19 ENCOUNTER — HEALTH MAINTENANCE LETTER (OUTPATIENT)
Age: 52
End: 2022-06-19

## 2022-11-19 ENCOUNTER — HEALTH MAINTENANCE LETTER (OUTPATIENT)
Age: 52
End: 2022-11-19

## 2022-12-18 ENCOUNTER — HEALTH MAINTENANCE LETTER (OUTPATIENT)
Age: 52
End: 2022-12-18

## 2023-04-09 ENCOUNTER — HEALTH MAINTENANCE LETTER (OUTPATIENT)
Age: 53
End: 2023-04-09

## 2023-09-09 ENCOUNTER — HEALTH MAINTENANCE LETTER (OUTPATIENT)
Age: 53
End: 2023-09-09

## 2023-10-26 ENCOUNTER — TELEPHONE (OUTPATIENT)
Dept: BEHAVIORAL HEALTH | Facility: CLINIC | Age: 53
End: 2023-10-26

## 2023-10-26 ENCOUNTER — HOSPITAL ENCOUNTER (OUTPATIENT)
Facility: CLINIC | Age: 53
Setting detail: OBSERVATION
Discharge: PSYCHIATRIC OR SUBSTANCE USE FACILITY | End: 2023-10-31
Attending: EMERGENCY MEDICINE | Admitting: EMERGENCY MEDICINE
Payer: COMMERCIAL

## 2023-10-26 DIAGNOSIS — F17.200 TOBACCO USE DISORDER: ICD-10-CM

## 2023-10-26 DIAGNOSIS — F22 PARANOIA (H): ICD-10-CM

## 2023-10-26 DIAGNOSIS — F33.3 SEVERE RECURRENT MAJOR DEPRESSIVE DISORDER WITH PSYCHOSIS (H): ICD-10-CM

## 2023-10-26 LAB
AMPHETAMINES UR QL SCN: NORMAL
ANION GAP SERPL CALCULATED.3IONS-SCNC: 11 MMOL/L (ref 7–15)
BARBITURATES UR QL SCN: NORMAL
BENZODIAZ UR QL SCN: NORMAL
BUN SERPL-MCNC: 10.5 MG/DL (ref 6–20)
BZE UR QL SCN: NORMAL
CALCIUM SERPL-MCNC: 9.2 MG/DL (ref 8.6–10)
CANNABINOIDS UR QL SCN: NORMAL
CHLORIDE SERPL-SCNC: 99 MMOL/L (ref 98–107)
CREAT SERPL-MCNC: 0.72 MG/DL (ref 0.51–0.95)
DEPRECATED HCO3 PLAS-SCNC: 25 MMOL/L (ref 22–29)
EGFRCR SERPLBLD CKD-EPI 2021: >90 ML/MIN/1.73M2
ERYTHROCYTE [DISTWIDTH] IN BLOOD BY AUTOMATED COUNT: 13.1 % (ref 10–15)
ETHANOL SERPL-MCNC: <0.01 G/DL
FENTANYL UR QL: NORMAL
GLUCOSE SERPL-MCNC: 186 MG/DL (ref 70–99)
HCT VFR BLD AUTO: 42.6 % (ref 35–47)
HGB BLD-MCNC: 14.3 G/DL (ref 11.7–15.7)
MCH RBC QN AUTO: 32.9 PG (ref 26.5–33)
MCHC RBC AUTO-ENTMCNC: 33.6 G/DL (ref 31.5–36.5)
MCV RBC AUTO: 98 FL (ref 78–100)
OPIATES UR QL SCN: NORMAL
PCP QUAL URINE (ROCHE): NORMAL
PLATELET # BLD AUTO: 247 10E3/UL (ref 150–450)
POTASSIUM SERPL-SCNC: 4.4 MMOL/L (ref 3.4–5.3)
RBC # BLD AUTO: 4.35 10E6/UL (ref 3.8–5.2)
SARS-COV-2 RNA RESP QL NAA+PROBE: NEGATIVE
SODIUM SERPL-SCNC: 135 MMOL/L (ref 135–145)
TSH SERPL DL<=0.005 MIU/L-ACNC: 2.38 UIU/ML (ref 0.3–4.2)
WBC # BLD AUTO: 8.3 10E3/UL (ref 4–11)

## 2023-10-26 PROCEDURE — 87635 SARS-COV-2 COVID-19 AMP PRB: CPT | Performed by: NURSE PRACTITIONER

## 2023-10-26 PROCEDURE — 99223 1ST HOSP IP/OBS HIGH 75: CPT | Performed by: NURSE PRACTITIONER

## 2023-10-26 PROCEDURE — 85027 COMPLETE CBC AUTOMATED: CPT | Performed by: EMERGENCY MEDICINE

## 2023-10-26 PROCEDURE — 80307 DRUG TEST PRSMV CHEM ANLYZR: CPT | Performed by: EMERGENCY MEDICINE

## 2023-10-26 PROCEDURE — 80048 BASIC METABOLIC PNL TOTAL CA: CPT | Performed by: EMERGENCY MEDICINE

## 2023-10-26 PROCEDURE — 36415 COLL VENOUS BLD VENIPUNCTURE: CPT | Performed by: EMERGENCY MEDICINE

## 2023-10-26 PROCEDURE — 250N000013 HC RX MED GY IP 250 OP 250 PS 637: Performed by: NURSE PRACTITIONER

## 2023-10-26 PROCEDURE — 84443 ASSAY THYROID STIM HORMONE: CPT | Performed by: EMERGENCY MEDICINE

## 2023-10-26 PROCEDURE — 82077 ASSAY SPEC XCP UR&BREATH IA: CPT | Performed by: EMERGENCY MEDICINE

## 2023-10-26 PROCEDURE — G0378 HOSPITAL OBSERVATION PER HR: HCPCS

## 2023-10-26 PROCEDURE — 99285 EMERGENCY DEPT VISIT HI MDM: CPT

## 2023-10-26 RX ORDER — GABAPENTIN 600 MG/1
600 TABLET ORAL 3 TIMES DAILY
Status: ON HOLD | COMMUNITY
End: 2023-11-02

## 2023-10-26 RX ORDER — ALBUTEROL SULFATE 90 UG/1
2 AEROSOL, METERED RESPIRATORY (INHALATION) EVERY 4 HOURS PRN
Status: DISCONTINUED | OUTPATIENT
Start: 2023-10-26 | End: 2023-10-31 | Stop reason: HOSPADM

## 2023-10-26 RX ORDER — ATORVASTATIN CALCIUM 40 MG/1
40 TABLET, FILM COATED ORAL EVERY EVENING
Status: DISCONTINUED | OUTPATIENT
Start: 2023-10-26 | End: 2023-10-31 | Stop reason: HOSPADM

## 2023-10-26 RX ORDER — VARENICLINE TARTRATE 0.5 MG/1
0.5 TABLET, FILM COATED ORAL
Status: COMPLETED | OUTPATIENT
Start: 2023-10-27 | End: 2023-10-29

## 2023-10-26 RX ORDER — FLUTICASONE FUROATE AND VILANTEROL 200; 25 UG/1; UG/1
1 POWDER RESPIRATORY (INHALATION) DAILY
Status: DISCONTINUED | OUTPATIENT
Start: 2023-10-27 | End: 2023-10-27

## 2023-10-26 RX ORDER — ARIPIPRAZOLE 5 MG/1
5 TABLET ORAL DAILY
Status: DISCONTINUED | OUTPATIENT
Start: 2023-10-26 | End: 2023-10-31 | Stop reason: HOSPADM

## 2023-10-26 RX ORDER — QUETIAPINE FUMARATE 50 MG/1
50 TABLET, FILM COATED ORAL 3 TIMES DAILY PRN
Status: DISCONTINUED | OUTPATIENT
Start: 2023-10-26 | End: 2023-10-31 | Stop reason: HOSPADM

## 2023-10-26 RX ORDER — ESCITALOPRAM OXALATE 10 MG/1
10 TABLET ORAL DAILY
Status: DISCONTINUED | OUTPATIENT
Start: 2023-10-26 | End: 2023-10-26

## 2023-10-26 RX ORDER — HYDROXYZINE HYDROCHLORIDE 50 MG/1
50 TABLET, FILM COATED ORAL EVERY 6 HOURS PRN
Status: DISCONTINUED | OUTPATIENT
Start: 2023-10-26 | End: 2023-10-31 | Stop reason: HOSPADM

## 2023-10-26 RX ORDER — VARENICLINE TARTRATE 1 MG/1
1 TABLET, FILM COATED ORAL 2 TIMES DAILY WITH MEALS
Status: DISCONTINUED | OUTPATIENT
Start: 2023-11-03 | End: 2023-10-31 | Stop reason: HOSPADM

## 2023-10-26 RX ORDER — GABAPENTIN 300 MG/1
600 CAPSULE ORAL 3 TIMES DAILY
Status: DISCONTINUED | OUTPATIENT
Start: 2023-10-26 | End: 2023-10-31 | Stop reason: HOSPADM

## 2023-10-26 RX ORDER — LANOLIN ALCOHOL/MO/W.PET/CERES
3 CREAM (GRAM) TOPICAL
Status: DISCONTINUED | OUTPATIENT
Start: 2023-10-26 | End: 2023-10-31 | Stop reason: HOSPADM

## 2023-10-26 RX ORDER — ACETAMINOPHEN 325 MG/1
650 TABLET ORAL EVERY 4 HOURS PRN
Status: DISCONTINUED | OUTPATIENT
Start: 2023-10-26 | End: 2023-10-31 | Stop reason: HOSPADM

## 2023-10-26 RX ORDER — ONDANSETRON 4 MG/1
4 TABLET, ORALLY DISINTEGRATING ORAL EVERY 6 HOURS PRN
Status: DISCONTINUED | OUTPATIENT
Start: 2023-10-26 | End: 2023-10-31 | Stop reason: HOSPADM

## 2023-10-26 RX ORDER — IBUPROFEN 600 MG/1
600 TABLET, FILM COATED ORAL EVERY 6 HOURS PRN
Status: DISCONTINUED | OUTPATIENT
Start: 2023-10-26 | End: 2023-10-31 | Stop reason: HOSPADM

## 2023-10-26 RX ORDER — VARENICLINE TARTRATE 0.5 MG/1
0.5 TABLET, FILM COATED ORAL 2 TIMES DAILY WITH MEALS
Status: DISCONTINUED | OUTPATIENT
Start: 2023-10-30 | End: 2023-10-31 | Stop reason: HOSPADM

## 2023-10-26 RX ADMIN — ATORVASTATIN CALCIUM 40 MG: 40 TABLET, FILM COATED ORAL at 22:40

## 2023-10-26 RX ADMIN — METFORMIN HYDROCHLORIDE 500 MG: 500 TABLET, FILM COATED ORAL at 22:40

## 2023-10-26 RX ADMIN — ARIPIPRAZOLE 5 MG: 5 TABLET ORAL at 22:40

## 2023-10-26 RX ADMIN — GABAPENTIN 600 MG: 300 CAPSULE ORAL at 22:40

## 2023-10-26 ASSESSMENT — ACTIVITIES OF DAILY LIVING (ADL)
ADLS_ACUITY_SCORE: 35

## 2023-10-26 NOTE — ED NOTES
[facility]  ED to EMPATH Checklist:      Goal for EMPATH: Depression management    Current Behavior: Calm    Safety Concerns: None    Legal Hold Status: Voluntary    Medically Cleared by ED provider: Yes    Patient Therapeutically Searched: Therapeutic search by ED staff (strings, belts, shoes, pockets, electronics, etc.)    Belongings: Remain with patient    Independent Ambulation at Baseline: Yes/No: Yes    Participates in Care/Conversation: Yes/No: Yes    Patient Informed about EMPATH: Yes/No: Yes    DEC:  Not ordered    Patient Ready to be Transferred to EMPATH? Yes/No: Yes

## 2023-10-26 NOTE — PROGRESS NOTES
IP MH Referral Acuity Rating Score (RARS)    LMHP complete at referral to IP MH, with DEC; and, daily while awaiting IP MH placement. Call score to PPS.  CRITERIA SCORING   New 72 HH and Involuntary for IP MH (not adolescent) 0/1   Boarding over 24 hours 0/1   Vulnerable adult at least 55+ with multiple co morbidities; or, Patient age 11 or under 0/1   Suicide ideation without relief of precipitating factors 1/1   Current plan for suicide 0/1   Current plan for homicide 0/1   Imminent risk or actual attempt to seriously harm another without relief of factors precipitating the attempt 0/1   Severe dysfunction in daily living (ex: complete neglect for self care, extreme disruption in vegetative function, extreme deterioration in social interactions) 1/1   Recent (last 2 weeks) or current physical aggression in the ED 0/1   Restraints or seclusion episode in ED 0/1   Verbal aggression, agitation, yelling, etc., while in the ED 0/1   Active psychosis with psychomotor agitation or catatonia 1/1   Need for constant or near constant redirection (from leaving, from others, etc).  0/1   Intrusive or disruptive behaviors 0/1   TOTAL Acuity Total Score: 3
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
## 2023-10-26 NOTE — ED NOTES
53 year old female received from ED due to increased depression and paranoia. Pt reports high anxiety and states that she has been seeing airplanes and cars following her and that she believes that the FBI is after her. Reports passive SI. Nursing and risk assessments completed. Assessments reviewed with [facility]HP and physician. Admission information reviewed with patient. Patient given a tour of [facility] and instructions on using the facility. Questions regarding [facility] addressed. Pt safety search completed.

## 2023-10-26 NOTE — ED NOTES
"PIT/Triage Evaluation    Patient presented with psychiatric evaluation. She is tired of being watched and followed by the \"FBI\". She has been followed for a long time. Recently got bad this past several months and worse daily. Mom states she finally decided it was time.  and her has been having some trouble and Saturday it escalated. She also takes off driving in the middle of night. This has been ongoing for years. Patient states that she is not taking any medications right now besides gabapentin, metformin, and inhalers.     Exam is notable for:    Patient Vitals for the past 24 hrs:   BP Temp Temp src Pulse Resp SpO2 Weight   10/26/23 1348 (!) 142/82 98.7  F (37.1  C) Temporal 95 18 98 % 93.9 kg (207 lb)     General: Alert, No distress. Nontoxic appearance. Head down, shaking head, and poor eye contact.   Head: No signs of trauma.   Mouth/Throat: Oropharynx moist.   Eyes: Conjunctivae are normal. Pupils are equal..   Neck: Normal range of motion.    CV: Appears well perfused.  Resp:No respiratory distress.   MSK: Normal range of motion. No obvious deformity.   Neuro: The patient is alert and interactive. SANCHEZ. Speech normal. GCS 15  Skin: No lesion or sign of trauma noted.   Psych: normal mood and affect. behavior is normal.     Appropriate interventions for symptom management were initiated if applicable.  Appropriate diagnostic tests were initiated if indicated.    Important information for subsequent clinician:  The patient sister states that she has been treated at [facility] and [facility] in the past but the patient has been let go after a 72-hour hold.  The sister does not believe the patient has ever been hospitalized as an inpatient.  The patient's sister states that paranoid schizophrenia runs in the family.  Laboratory studies pending.  [facility] admission probable.    I briefly evaluated the patient and developed an initial plan of care. I discussed this plan and explained that this " brief interaction does not constitute a full evaluation. Patient/family understands that they should wait to be fully evaluated and discuss any test results with another clinician prior to leaving the hospital.    Scribe Disclosure:  I, Ismael Negretelisa, am serving as a scribe at 1:45 PM on 10/26/2023 to document services personally performed by Romeo Beard MD based on my observations and the provider's statements to me.     Romeo Beard MD  10/26/23 9694

## 2023-10-26 NOTE — ED PROVIDER NOTES
"  History     Chief Complaint:  Depression and Psychiatric Evaluation       The history is provided by the patient and a relative.      Marsha Charles is a 53 year old female who presents with paranoia and psychiatric evaluation. She is tired of being watched and followed by the \"FBI\". She has been followed for a long time. Recently got bad this past several months and worse daily. Mom states she finally decided it was time.  and her has been having some trouble and Saturday it escalated. She also takes off driving in the middle of night and shows up at her moms house at 2 am in [place]. This has been ongoing for years. Patient states that she is not taking any medications right now besides gabapentin, metformin, and inhalers.    The sister also notes that there is a family history of paranoid and schizophrenia. She also notes she has been treated for this but has never been hospitalized as an inpatient.     Independent Historian:   Mother - They report per HPI and Sibling - They report per HPI    Review of External Notes:   I reviewed a 2023 urgent care note from [facility].    Medications:    Budeson-Glycopyrrol-Formoterol   Cyclobenzaprine   Doxycycline hyclate   Guaifenesin-codeine   Hydrocodone-acetaminophen   Methylprednisolone   Budesonide-Formoterol Fumarate   Metformin   Varenicline tartrate   Albuterol   Atorvastatin   Benzonatate   Gabapentin   Ipratropium   Sumatriptan     Past Medical History:    COPD  Diabetes   Hypercholesterolemia   Asthma   Cervicalgia   Dysphonia     Past Surgical History:     section x2  Cholecystectomy   Colonoscopy   D & C x3  Carpal tunnel release   Genitourinary surgery   Hernia repair   Hysterectomy   Transpubo without anterior colporrhaphy      Physical Exam   Patient Vitals for the past 24 hrs:   BP Temp Temp src Pulse Resp SpO2 Height Weight   10/26/23 1548 122/86 98.8  F (37.1  C) Oral 96 18 95 % -- --   10/26/23 1547 -- -- -- -- -- -- 1.626 m (5' 4\") " 82.6 kg (182 lb 1.6 oz)   10/26/23 1348 (!) 142/82 98.7  F (37.1  C) Temporal 95 18 98 % -- 93.9 kg (207 lb)        Physical Exam  General: Alert, No distress. Nontoxic appearance. Head down, shaking head, and poor eye contact.   Head: No signs of trauma.   Mouth/Throat: Oropharynx moist.   Eyes: Conjunctivae are normal. Pupils are equal..   Neck: Normal range of motion.    CV: Appears well perfused.  Resp:No respiratory distress.   MSK: Normal range of motion. No obvious deformity.   Neuro: The patient is alert and interactive. SANCHEZ. Speech normal. GCS 15  Skin: No lesion or sign of trauma noted.   Psych: Anxious.  Depressed.  Paranoid.  The patient feels that the FBI is following her.  Denies active suicidal ideation  Emergency Department Course     Laboratory:  Labs Ordered and Resulted from Time of ED Arrival to Time of ED Departure   BASIC METABOLIC PANEL - Abnormal       Result Value    Sodium 135      Potassium 4.4      Chloride 99      Carbon Dioxide (CO2) 25      Anion Gap 11      Urea Nitrogen 10.5      Creatinine 0.72      GFR Estimate >90      Calcium 9.2      Glucose 186 (*)    CBC WITH PLATELETS - Normal    WBC Count 8.3      RBC Count 4.35      Hemoglobin 14.3      Hematocrit 42.6      MCV 98      MCH 32.9      MCHC 33.6      RDW 13.1      Platelet Count 247     TSH WITH FREE T4 REFLEX - Normal    TSH 2.38     URINE DRUG SCREEN PANEL - Normal    Amphetamines Urine Screen Negative      Barbituates Urine Screen Negative      Benzodiazepine Urine Screen Negative      Cannabinoids Urine Screen Negative      Cocaine Urine Screen Negative      Fentanyl Qual Urine Screen Negative      Opiates Urine Screen Negative      PCP Urine Screen Negative        Emergency Department Course & Assessments:       Interventions:  Medications - No data to display     Assessments:  1345 I obtained history and examined the patient as noted above.    Independent Interpretation (X-rays, CTs, rhythm  strip):  None    Consultations/Discussion of Management or Tests:  I consulted with the [facility] staff    Social Determinants of Health affecting care:   Healthcare Access/Compliance and Stress/Adjustment Disorders    Disposition:  The patient was transferred to [facility].     Impression & Plan      Medical Decision Making:  This patient is presenting to the ER with altered mental status and paranoia.  Patient has a longstanding history of paranoia according to her family members.  There have not been new changes in her medications or recent trauma that could have influenced these symptoms.  The symptoms have been worsening over a period of months.  Laboratory studies reveals no evidence of thyroid disorder or electrolyte disturbance that could explain the symptoms.  The patient is voluntary and [facility]    Diagnosis:    ICD-10-CM    1. Paranoia (H)  F22       2. Severe recurrent major depressive disorder with psychosis (H)  F33.3       3. Tobacco use disorder  F17.200             Scribe Disclosure:  IIsmael, am serving as a scribe at 2:56 PM on 10/26/2023 to document services personally performed by Romeo Beard MD based on my observations and the provider's statements to me.   10/26/2023   Romeo Beard MD Joing, Todd Roger, MD  10/26/23 9347

## 2023-10-26 NOTE — PLAN OF CARE
Marsha Charles  October 26, 2023  Plan of Care Hand-off Note     Patient Care Path: inpatient mental health    Plan for Care:   Writer at the time of assessment recommends inpatient stabilization.  Pt is acutely paranoid, depressed, and anxious. Due to her signficant level of paranoia Pt endorses active suicidal ideation.  Pt is in need of further stabilization and evaluation.    Identified Goals and Safety Issues:  reduce paranoia, depression and suicidal ideation      Legal Status: Legal Status at Admission: Voluntary/Patient has signed consent for treatment    Psychiatry Consult: [facility]       Updated RN and NP  regarding plan of care.           Yudelka Bills [facility]

## 2023-10-26 NOTE — ED TRIAGE NOTES
Patient with hallucinations for weeks, had outburst with  over the weekend worsened depression and hallucinations. States being sick of getting followed by FBI. Willing to go to [facility].   Triage Assessment (Adult)       Row Name 10/26/23 2827          Triage Assessment    Airway WDL WDL        Respiratory WDL    Respiratory WDL WDL        Skin Circulation/Temperature WDL    Skin Circulation/Temperature WDL WDL        Cardiac WDL    Cardiac WDL WDL        Peripheral/Neurovascular WDL    Peripheral Neurovascular WDL WDL        Cognitive/Neuro/Behavioral WDL    Cognitive/Neuro/Behavioral WDL X

## 2023-10-27 ENCOUNTER — TELEPHONE (OUTPATIENT)
Dept: BEHAVIORAL HEALTH | Facility: CLINIC | Age: 53
End: 2023-10-27
Payer: COMMERCIAL

## 2023-10-27 PROCEDURE — 250N000013 HC RX MED GY IP 250 OP 250 PS 637: Performed by: NURSE PRACTITIONER

## 2023-10-27 PROCEDURE — G0378 HOSPITAL OBSERVATION PER HR: HCPCS

## 2023-10-27 RX ORDER — FLUTICASONE FUROATE AND VILANTEROL 200; 25 UG/1; UG/1
1 POWDER RESPIRATORY (INHALATION) DAILY
Status: DISCONTINUED | OUTPATIENT
Start: 2023-10-28 | End: 2023-10-31 | Stop reason: HOSPADM

## 2023-10-27 RX ORDER — LORAZEPAM 0.5 MG/1
.5-1 TABLET ORAL EVERY 6 HOURS PRN
Status: DISCONTINUED | OUTPATIENT
Start: 2023-10-27 | End: 2023-10-31 | Stop reason: HOSPADM

## 2023-10-27 RX ADMIN — GABAPENTIN 600 MG: 300 CAPSULE ORAL at 19:32

## 2023-10-27 RX ADMIN — ACETAMINOPHEN 650 MG: 325 TABLET, FILM COATED ORAL at 14:27

## 2023-10-27 RX ADMIN — GABAPENTIN 600 MG: 300 CAPSULE ORAL at 14:27

## 2023-10-27 RX ADMIN — ACETAMINOPHEN 650 MG: 325 TABLET, FILM COATED ORAL at 06:49

## 2023-10-27 RX ADMIN — VARENICLINE TARTRATE 0.5 MG: 0.5 TABLET, FILM COATED ORAL at 08:36

## 2023-10-27 RX ADMIN — LORAZEPAM 0.5 MG: 0.5 TABLET ORAL at 20:45

## 2023-10-27 RX ADMIN — ATORVASTATIN CALCIUM 40 MG: 40 TABLET, FILM COATED ORAL at 19:32

## 2023-10-27 RX ADMIN — METFORMIN HYDROCHLORIDE 500 MG: 500 TABLET, FILM COATED ORAL at 08:35

## 2023-10-27 RX ADMIN — ALBUTEROL SULFATE 2 PUFF: 90 AEROSOL, METERED RESPIRATORY (INHALATION) at 20:45

## 2023-10-27 RX ADMIN — METFORMIN HYDROCHLORIDE 500 MG: 500 TABLET, FILM COATED ORAL at 19:32

## 2023-10-27 RX ADMIN — GABAPENTIN 600 MG: 300 CAPSULE ORAL at 08:35

## 2023-10-27 RX ADMIN — ARIPIPRAZOLE 5 MG: 5 TABLET ORAL at 08:35

## 2023-10-27 RX ADMIN — FLUTICASONE FUROATE AND VILANTEROL TRIFENATATE 1 PUFF: 200; 25 POWDER RESPIRATORY (INHALATION) at 09:12

## 2023-10-27 ASSESSMENT — ACTIVITIES OF DAILY LIVING (ADL)
ADLS_ACUITY_SCORE: 35

## 2023-10-27 NOTE — TELEPHONE ENCOUNTER
R: [place]  Access Inpatient Bed Call Log 10/27/23 @ 1:00am   Intake has called facilities that have not updated their bed status within the last 12 hours.??     *METRO:  [place] -- [facility]: @ cap per website.  [place] -- [facility]:  @ cap per website.  [place] -- [facility]: @ cap per website.  [place] -- [facility]: TEMPORARILY CLOSED DUE TO COVID EXPOSURE.  [place] -- [facility]: @ cap per website.  [facility]: @ cap per website.  [facility]/ beds - POSTING 1 BED. Ages 18-25, Voluntary only, COVID test req'd, NO aggression, physical or sexual assault, violence hx or drug abuse  [place] -- [facility]: @ cap per website.  [place] -- [facility]: @ cap per website.  [place] -- [facility]: @ cap per website.     Pt remains on waitlist pending appropriate placement availability

## 2023-10-27 NOTE — ED NOTES
Pt has been calm and cooperative. She however kept asking when she will be transferring to the other facility. She is being reminded that unless a bed is available and she is accepted to the other facility we would not know.

## 2023-10-27 NOTE — TELEPHONE ENCOUNTER
R: [place]  Access Inpatient Bed Call Log 10/26/23 @ 6:15 PM   Intake has called facilities that have not updated the bed status within the last 12 hours.                                METRO only:            [facility] is posting 0 beds.                        [facility] is posting 0 beds. [phone].  Per call at 7:12 am to [facility], they are at cap.              [facility] is posting 0 beds. Negative covid required.                            [facility] is posting 0 beds. Negative covid required. [phone]; Per call at 7:13 am to [facility], they are at capacity.             [facility] is posting 0 bed. [phone]              [facility] is posting 0 beds. [phone].               [facility] is posting 1 bed. Negative covid. [phone].              [facility] is posting 0 beds                      [facility] is posting 0 beds.                  Pt remains on the work list pending appropriate bed availability.

## 2023-10-27 NOTE — ED NOTES
Pt was awake at start of shift, A/O x 3, calm and cooperative with fair affect. Pt ordered breakfast and ate all, medication compliant. Pt wanted to know if family could drive her to the IP facility that she volunteered to go. Writer explained to her that patients can only go in ambulance. Pt stated she is only trying to avoid the cost ost of transportation. She was however told that the policy does not allow patients to transport themselves into inpatient facility. Still waiting for IP placement.

## 2023-10-27 NOTE — PROGRESS NOTES
Pt resting with eyes closed, she has been anxious about her PRN inhaler, voicing she has been needing it when she wakes in the AM-reassured her it is here when she needs it. Voices no other complaints.

## 2023-10-27 NOTE — PROGRESS NOTES
EmPATH Group Progress Note    Client Name: Marsha Charles  Date: October 26, 2023  Service Type:  Group Therapy  Facilitator:me@        Response:  Patient did not participate in group.      JEFFERY Knott

## 2023-10-27 NOTE — TELEPHONE ENCOUNTER
Updated Bed Search @ 550PM  Per chart review, intake can look in the metro area for placement     [facility] has 0 appropriate beds available. Phone: [phone]  [facility] posting 0 available beds. Phone: [phone]  [facility] posting 0 available beds. Phone: [phone]  [facility] posting 0 available beds. Phone: [phone]  [facility] posting 0 available beds. Phone: [phone]  [facility] posting 0 available beds. Phone:[phone]  [facility] positi 1 available beds. Phone: [phone] Negative covid required, no recent or significant aggression, violence, or sexual assault. Ages 18-26 only. Pt not appropriate.   [facility] posting 0 available beds. Phone: [phone]. Negative covid required.     Pt remains on work list pending appropriate bed placement.

## 2023-10-27 NOTE — ED PROVIDER NOTES
"EmPATH Unit - Initial Psychiatric Observation Note  [facility] Emergency Department  Observation Initiation Date: Oct 26, 2023    Marsha Charles MRN: [id number]   Age: 53 year old YOB: 1970     History     Chief Complaint   Patient presents with    Depression    Psychiatric Evaluation     HPI  Marsha Charles is a 53 year old female with a past history notable for major depressive disorder, severe with psychosis who presented to the emergency department with increasing depression, paranoia, fixed delusions/visual hallucinations in the context of stopping medications. She was initially seen in the emergency department cleared medically and transferred to the EmPATH unit for further assessment, stabilization and treatment planning. She is nearing 9 hours in the emergency department. Review of records shows previous hospitalizations for her mental health at [facility].    Today on approach she was resting in a recliner, she startled awake when lightly roused by writer. She readily agreed to accompany me to a conference room for an interview. Throughout the interview, her eye contact was poor and she was crying off and on and her speech is halting. She said \"I just don't want to be afraid of people following me and watching me all of the time.\" Reportedly, she has been driving around late at night and put on 10k miles on their car in the last 30 days. There is a strong family history of paranoia and schizophrenia. She endorses feeling down, depressed and hopeless. She reports interrupted sleep. She eats one meal a day, which has been normal for her for many years. She drinks caffeine until her first and only meal of the day, which is around 2 pm. She reports passive suicidal thoughts. She reports that her  asked her to leave their home on Saturday and she has been staying with her mother in [place]. She reports like she is \"hitting rock bottom\", and although she expressed feeling anxious about " transitioning to an inpatient bed, she knows she needs to do so. She denies auditory hallucinations, endorses visual hallucinations (holes in the wall where the FBI are watching her), endorses delusions, and paranoia. She reports an increase in cigarette smoking (2.5 - 3 packs per day), she would like to restart Chantix which she said has helped her significantly in the past. She reports that she stopped medications in the past because of side effects; however, it is unclear low long of a trial medications were tried. She noted weight gain with risperidone, and one medication caused her to sleep so deeply that she was incontinent. She is open to trying a new antipsychotic while here and restarting escitalopram which was started in the past.     Past Medical History  Past Medical History:   Diagnosis Date    COPD (chronic obstructive pulmonary disease)     Diabetes mellitus - type 2     Hypercholesterolemia     Obesity     Uncomplicated asthma 2010     Past Surgical History:   Procedure Laterality Date    C/SECTION, LOW TRANSVERSE      x 2    CHOLECYSTECTOMY      COLONOSCOPY  2020    D & C      x 3    ENDOSCOPIC RELEASE CARPAL TUNNEL      GENITOURINARY SURGERY      HERNIA REPAIR, INCISIONAL      HYSTERECTOMY, VAGINAL      SLING TRANSPUBO WITHOUT ANTERIOR COLPORRHAPHY N/A 12/12/2016    Procedure: SLING TRANSPUBO WITHOUT ANTERIOR COLPORRHAPHY;  Surgeon: Luis Hanson MD;  Location: [facility] OR     albuterol (PROAIR HFA/PROVENTIL HFA/VENTOLIN HFA) 108 (90 BASE) MCG/ACT Inhaler  atorvastatin (LIPITOR) 40 MG tablet  benzonatate (TESSALON) 200 MG capsule  budesonide-formoterol (SYMBICORT) 160-4.5 MCG/ACT Inhaler  gabapentin (NEURONTIN) 100 MG capsule  ipratropium - albuterol 0.5 mg/2.5 mg/3 mL (DUONEB) 0.5-2.5 (3) MG/3ML neb solution  metFORMIN (GLUCOPHAGE) 1000 MG tablet  SUMAtriptan (IMITREX) 100 MG tablet  Varenicline Tartrate (CHANTIX PO)  escitalopram (LEXAPRO) 10 MG tablet  risperiDONE (RISPERDAL) 4 MG  "tablet      No Known Allergies  Family History  Family History   Problem Relation Age of Onset    Diabetes Mother     Hypertension Mother     Hyperlipidemia Mother     Other Cancer Father      Social History   Social History     Tobacco Use    Smoking status: Every Day     Packs/day: 1     Types: Cigarettes    Smokeless tobacco: Never    Tobacco comments:     30 yrs   Vaping Use    Vaping Use: Never used   Substance Use Topics    Alcohol use: No    Drug use: No          Review of Systems  A medically appropriate review of systems was performed with pertinent positives and negatives noted in the HPI, and all other systems negative.    Physical Examination   BP: (!) 142/82  Pulse: 95  Temp: 98.7  F (37.1  C)  Resp: 18  Height: 162.6 cm (5' 4\")  Weight: 93.9 kg (207 lb)  SpO2: 98 %    Physical Exam  General: Appears stated age.   Neuro: Alert and fully oriented. Extremities appear to demonstrate normal strength on visual inspection.   Integumentary/Skin: no rash visualized, normal color    Psychiatric Examination   Appearance: awake, alert  Attitude:  guarded and somewhat cooperative  Eye Contact:  poor   Mood:  anxious and depressed  Affect:  mood congruent  Speech:  paucity of speech and halting speech  Psychomotor Behavior:  no evidence of tardive dyskinesia, dystonia, or tics  Thought Process:  disorganized, evidence of thought blocking present, and illogical  Associations:  no loose associations  Thought Content:  passive suicidal ideation present, no auditory hallucinations present, and visual hallucinations present  Insight:  fair  Judgement:  fair  Oriented to:  time, person, and place  Attention Span and Concentration:  fair  Recent and Remote Memory:  limited  Language: able to name/identify objects without impairment  Fund of Knowledge: intact with awareness of current and past events    ED Course        Labs Ordered and Resulted from Time of ED Arrival to Time of ED Departure   BASIC METABOLIC PANEL - " Abnormal       Result Value    Sodium 135      Potassium 4.4      Chloride 99      Carbon Dioxide (CO2) 25      Anion Gap 11      Urea Nitrogen 10.5      Creatinine 0.72      GFR Estimate >90      Calcium 9.2      Glucose 186 (*)    CBC WITH PLATELETS - Normal    WBC Count 8.3      RBC Count 4.35      Hemoglobin 14.3      Hematocrit 42.6      MCV 98      MCH 32.9      MCHC 33.6      RDW 13.1      Platelet Count 247     TSH WITH FREE T4 REFLEX - Normal    TSH 2.38     URINE DRUG SCREEN PANEL - Normal    Amphetamines Urine Screen Negative      Barbituates Urine Screen Negative      Benzodiazepine Urine Screen Negative      Cannabinoids Urine Screen Negative      Cocaine Urine Screen Negative      Fentanyl Qual Urine Screen Negative      Opiates Urine Screen Negative      PCP Urine Screen Negative     ETHYL ALCOHOL LEVEL - Normal    Alcohol ethyl <0.01     COVID-19 VIRUS (CORONAVIRUS) BY PCR       Assessments & Plan (with Medical Decision Making)   Patient presenting with increasing paranoia and visual hallucinations/delusions in the presence of severe major depression. Her symptoms have interrupted her life to a point that she appears to be a risk to herself and possibly inadvertently to others. She agrees to transition to an inpatient bed when available. Her decrease in function is severe enough that if she decided to leave prior to transitioning to an inpatient bed at this time, she should be placed on a 72-hour hold.  Nursing notes reviewed noting no acute issues.     I have reviewed the assessment completed by the [facility].     During the observation period, the patient did not require medications for agitation, and did not require restraints/seclusion for patient and/or provider safety.     The patient was found to have a psychiatric condition that would benefit from an observation stay in the emergency department for further psychiatric stabilization and/or coordination of a safe disposition. The observation plan  includes serial assessments of psychiatric condition, potential administration of medications if indicated, further disposition pending the patient's psychiatric course during the monitoring period.     Preliminary diagnosis:    ICD-10-CM    1. Paranoia (H)  F22       2. Severe recurrent major depressive disorder with psychosis (H)  F33.3       3. Tobacco use disorder  F17.200            Treatment Plan:  - Place on list for inpatient admission  - Start escitalopram 10 mg daily  - Start aripiprazole 5 mg daily  - Continue prior to admission medications  - Chantix starter pack for smoking cessation  -Medication education provided this visit includes, rationale for medication, importance of compliance, medication interactions, and common side effects.   -Supportive psychotherapy provided regarding patient's stressors and past mental health symptoms problem solving ways to improve overall wellness and cope with acute and chronic mental health symptoms.  -Observe overnight and transition to an inpatient bed when available      --  CARMEN Flores CNP   [facility] EMERGENCY DEPT  EmPATH Unit      Lisa Thorne APRN CNP  10/26/23 [id number]

## 2023-10-27 NOTE — ED NOTES
Pt has been resting almost all evening. Wedding ring was returned home with family. Pt will go I/P voluntarily.

## 2023-10-27 NOTE — PROGRESS NOTES
Pt awake at this time, she got to sleep late, but once was asleep, she did sleep sound up until now.

## 2023-10-27 NOTE — TELEPHONE ENCOUNTER
R: [place]  Access Inpatient Bed Call Log 10/27/23 8:55 AM    Intake has called facilities that have not updated the bed status within the last 12 hours.                                 [facility] is at capacity.               [facility] is posting 0 beds. [phone].     [facility] is posting 0 beds. Negative covid required.                  [facility] is posting 0 beds. Negative covid required. [phone] Per call @8:52am, CRN in meeting until after 9am.   [facility] is posting 0 bed. [phone]    [facility] is posting 0 beds. [phone].     [facility] is posting 1 bed for Young Adults. Negative covid. [phone]. Per call @8:33am  Pt is too old  [facility] is posting 0 beds            [facility] is [facility] 0 beds.          Pt remains on the work list pending appropriate bed availability.

## 2023-10-28 ENCOUNTER — TELEPHONE (OUTPATIENT)
Dept: BEHAVIORAL HEALTH | Facility: CLINIC | Age: 53
End: 2023-10-28
Payer: COMMERCIAL

## 2023-10-28 PROCEDURE — 250N000013 HC RX MED GY IP 250 OP 250 PS 637: Performed by: NURSE PRACTITIONER

## 2023-10-28 PROCEDURE — 250N000013 HC RX MED GY IP 250 OP 250 PS 637: Performed by: PSYCHIATRY & NEUROLOGY

## 2023-10-28 PROCEDURE — 99233 SBSQ HOSP IP/OBS HIGH 50: CPT | Performed by: NURSE PRACTITIONER

## 2023-10-28 PROCEDURE — G0378 HOSPITAL OBSERVATION PER HR: HCPCS

## 2023-10-28 RX ORDER — NICOTINE 21 MG/24HR
1 PATCH, TRANSDERMAL 24 HOURS TRANSDERMAL DAILY
Status: DISCONTINUED | OUTPATIENT
Start: 2023-10-28 | End: 2023-10-31 | Stop reason: HOSPADM

## 2023-10-28 RX ORDER — TRAZODONE HYDROCHLORIDE 50 MG/1
50 TABLET, FILM COATED ORAL
Status: DISCONTINUED | OUTPATIENT
Start: 2023-10-28 | End: 2023-10-31 | Stop reason: HOSPADM

## 2023-10-28 RX ORDER — IBUPROFEN 600 MG/1
600 TABLET, FILM COATED ORAL ONCE
Status: DISCONTINUED | OUTPATIENT
Start: 2023-10-28 | End: 2023-10-28

## 2023-10-28 RX ADMIN — ALBUTEROL SULFATE 2 PUFF: 90 AEROSOL, METERED RESPIRATORY (INHALATION) at 14:58

## 2023-10-28 RX ADMIN — METFORMIN HYDROCHLORIDE 500 MG: 500 TABLET, FILM COATED ORAL at 18:23

## 2023-10-28 RX ADMIN — IBUPROFEN 600 MG: 600 TABLET ORAL at 08:51

## 2023-10-28 RX ADMIN — NICOTINE 1 PATCH: 21 PATCH, EXTENDED RELEASE TRANSDERMAL at 12:21

## 2023-10-28 RX ADMIN — HYDROXYZINE HYDROCHLORIDE 50 MG: 50 TABLET, FILM COATED ORAL at 14:59

## 2023-10-28 RX ADMIN — ALBUTEROL SULFATE 2 PUFF: 90 AEROSOL, METERED RESPIRATORY (INHALATION) at 07:14

## 2023-10-28 RX ADMIN — ATORVASTATIN CALCIUM 40 MG: 40 TABLET, FILM COATED ORAL at 20:11

## 2023-10-28 RX ADMIN — FLUTICASONE FUROATE AND VILANTEROL 1 PUFF: 200; 25 POWDER RESPIRATORY (INHALATION) at 08:52

## 2023-10-28 RX ADMIN — SERTRALINE HYDROCHLORIDE 50 MG: 50 TABLET ORAL at 20:11

## 2023-10-28 RX ADMIN — GABAPENTIN 600 MG: 300 CAPSULE ORAL at 20:10

## 2023-10-28 RX ADMIN — ACETAMINOPHEN 650 MG: 325 TABLET, FILM COATED ORAL at 07:12

## 2023-10-28 RX ADMIN — METFORMIN HYDROCHLORIDE 500 MG: 500 TABLET, FILM COATED ORAL at 08:50

## 2023-10-28 RX ADMIN — ARIPIPRAZOLE 5 MG: 5 TABLET ORAL at 08:50

## 2023-10-28 RX ADMIN — ACETAMINOPHEN 650 MG: 325 TABLET, FILM COATED ORAL at 14:59

## 2023-10-28 RX ADMIN — GABAPENTIN 600 MG: 300 CAPSULE ORAL at 13:48

## 2023-10-28 RX ADMIN — GABAPENTIN 600 MG: 300 CAPSULE ORAL at 08:50

## 2023-10-28 RX ADMIN — VARENICLINE TARTRATE 0.5 MG: 0.5 TABLET, FILM COATED ORAL at 08:52

## 2023-10-28 ASSESSMENT — ACTIVITIES OF DAILY LIVING (ADL)
ADLS_ACUITY_SCORE: 35

## 2023-10-28 NOTE — ED NOTES
Pt slept through the night uneventfully. No signs of distress noted. Respirations were even and unlabored during 15 min checks.

## 2023-10-28 NOTE — TELEPHONE ENCOUNTER
R: [place]  Access Inpatient Bed Call Log 10/28/23 6:37 PM  Intake can search the METRO for placement.                 [facility] is at capacity.              [facility] is posting 0 beds. [phone].   [facility] is posting 0 beds. Negative covid required.                 [facility] is posting 0 beds. Negative covid required. [phone]  [place] is posting 0 bed. [phone]  [facility] is posting 0 beds. [phone].   [facility] is posting 3 beds. Negative covid. [phone]. Pt not appropriate.  [facility] is posting 0 beds           [facility] ([facility] is posting 0 beds.    Pt remains on the work list pending appropriate bed availability.

## 2023-10-28 NOTE — TELEPHONE ENCOUNTER
R: [place]  Access Inpatient Bed Call Log 10/28/23 7:30 AM   Intake has called facilities that have not updated the bed status within the last 12 hours.                                 [facility] is at capacity.               [facility] is posting 0 beds. [phone].     [facility] is posting 0 beds. Negative covid required.                  [facility] is posting 0 beds. Negative covid required. [phone] Per call at 7:35am to [address] currently no beds available.   [facility] is posting 0 bed. [phone]    [facility] is posting 0 beds. [phone].   [facility] is posting 0 beds            [facility] is posting 0 beds.           Pt remains on the work list pending appropriate bed availability.

## 2023-10-28 NOTE — ED NOTES
IP MH Referral Acuity Rating Score (RARS)     LMHP complete at referral to IP MH, with DEC; and, daily while awaiting IP MH placement. Call score to PPS.  CRITERIA SCORING   New 72 HH and Involuntary for IP MH (not adolescent) 0/1   Boarding over 24 hours 1/1   Vulnerable adult at least 55+ with multiple co morbidities; or, Patient age 11 or under 0/1   Suicide ideation without relief of precipitating factors 1/1   Current plan for suicide 0/1   Current plan for homicide 0/1   Imminent risk or actual attempt to seriously harm another without relief of factors precipitating the attempt 0/1   Severe dysfunction in daily living (ex: complete neglect for self care, extreme disruption in vegetative function, extreme deterioration in social interactions) 1/1   Recent (last 2 weeks) or current physical aggression in the ED 0/1   Restraints or seclusion episode in ED 0/1   Verbal aggression, agitation, yelling, etc., while in the ED 0/1   Active psychosis with psychomotor agitation or catatonia 1/1   Need for constant or near constant redirection (from leaving, from others, etc).  0/1   Intrusive or disruptive behaviors 0/1   TOTAL Acuity Total Score: 4

## 2023-10-28 NOTE — PROGRESS NOTES
"Triage & Transition Services EmPATH     Progress Note    Patient: June goes by \"June,\" uses she/her pronouns  Date of Service: October 27, 2023  Site of Service:   Patient was seen in-person.     Presenting problem:  Please see initial [facility] Crisis Assessment completed by Writer on 10/26/2023 for complete assessment information. Notable concerns include paranoia, delusional thinking, depression.     Individuals Present: Marsha & Yudelka Negar [facility]    Session start: 620  Session end: 705  Session duration in minutes: 25  CPT utilized: 81246 - Psychotherapy (with patient) - 30 (16-37*) min    Current Presentation:   Pt was found watching TV in the recliner.  Pt reports that she slept okay but feels \"bored\" today.  Pt presents and acknowledges that she is highly irritable and agitated today. Pt reports that \"I just want a tranquilizer to put me out\".  Writer notified NP and RN. She reports that she doesn't like feeling stuck and she \"just wants to get going on tx\".  Writer attempted to redirect her that she is engaging in treatment here via medications, therapy, psychiatry etc.  Pt requested lots of information about diagnosis, treatment options, and level of cares of which we were able to discuss.  Pt reports overall frustration with the health care system and having to repeat her story numerous times to various providers.  Pt presents as depressed and tearful, more irritable then yesterday.  Pt is highly paranoid and suspicious of staff today.  Pt also reports that she typically smokes 3 packs a day and having gum and chantix is not enough.    Additional Collateral Information:  N/a     Mental Status Exam     Affect: Flat and Labile   Appearance: Appropriate    Attention Span/Concentration: Attentive  Eye Contact: Variable   Fund of Knowledge: Appropriate    Language /Speech Content: Fluent   Language /Speech Volume: Soft    Language /Speech Rate/Productions: Normal    Recent Memory: Intact   Remote Memory: Intact "   Mood: Anxious, Depressed, Irritable, and Sad    Orientation to Person: Yes    Orientation to Place: Yes   Orientation to Time of Day: Yes    Orientation to Date: Yes    Situation (Do they understand why they are here?): Yes    Psychomotor Behavior: Agitated    Thought Content: Delusions and Paranoia   Thought Form: Obsessive/Perseverative and Tangential     Diagnosis:   Diagnosis       Patient Active Problem List   Diagnosis Code    Mixed incontinence N39.46    Asthma, moderate persistent J45.40    BMI 33.0-33.9,adult Z68.33    Chronic constipation K59.09    COPD (chronic obstructive pulmonary disease) (H) J44.9    COPD with chronic bronchitis J44.89    Diabetic neuropathy (H) E11.40    Hypercholesterolemia E78.00    Severe recurrent major depressive disorder with psychosis (H) F33.3    Tobacco use disorder F17.200    Type 2 diabetes mellitus (H) E11.9    Shortness of breath R06.02         Primary Problem This Admission  Active Hospital Problems    Severe recurrent major depressive disorder with psychosis (H)    Therapeutic Intervention(s):   Provided active listening, unconditional positive regard, and validation. Identified and practiced coping skills. Identified stress relief practices.    Treatment Objective(s) Addressed:   The focus of this session was on orienting the patient to therapy and identifying and practicing coping strategies.     Progress Towards Goals:   Patient reports stable symptoms. Patient is making progress towards treatment goals as evidenced.  Pt con't to be highly symptomatic      Case Management:        Plan and Clinical Summary and Substantiation of Recommendations:   Inpatient Mental Health: Writer at the time of assessment recommends inpatient stabilization.  Pt is acutely paranoid, depressed, and anxious. Due to her signficant level of paranoia Pt endorses active suicidal ideation.  Pt is in need of further stabilization and evaluation.     Attending concurs with disposition: Yes          Yudelka Bills, [facility]

## 2023-10-28 NOTE — PROGRESS NOTES
[facility] Progress Note    Client Name: Marsha Charles  Date: October 27, 2023  Service Type:  Group Therapy  Session Start Time:  620  Session End Time: 645  Session Length: 20  Attendees: Patient and other group members  Facilitator:me@     Topic:   Gratitude     Intervention:    Group process: support, challenge, affirm, psycho-education.     Response:  Patient did participate in group. Behavior in group was appropriate .        [facility]udelka Bills [facility]

## 2023-10-28 NOTE — PROGRESS NOTES
Patient told psychiatry provider, CARMEN Flores, that patient's insurance will only cover inpatient hospitalization within [facility].   Writer called intake at approximately 6:00 PM, and spoke with Melissa to relay the information. Melissa made the update.    Sejal Mcdaniel MA, [facility]  10/28/23

## 2023-10-28 NOTE — ED NOTES
Pt has spent time in her recliner watching tv and socializing with peers. Pt presents as flat, appears restless and tense. Pt seems irritable but was pleasant when interacting with staff. Pt had multiple requests of staff. Pt appears preoccupied and at times needed repetition of things that were said to her. Pt attended evening group with proper participation.

## 2023-10-28 NOTE — PLAN OF CARE
"Triage & Transition Services EmPATH     Progress Note    Patient: June goes by \"June,\" uses she/her pronouns  Date of Service: October 28, 2023  Site of Service:   Patient was seen in-person.     Presenting problem:  Please see initial [facility] Crisis Assessment completed by NANCY Matthews on 10/26/23 for complete assessment information. Notable concerns include paranoia, delusional thinking, depression.    Individuals Present: Marsha & Sejal FITZGERALD NANCY Mcdaniel    Session start: 17:01  Session end: 17:36  Session duration in minutes: 35 minutes  [facility] utilized: 60461 - Psychotherapy (with patient) - 30 (16-37*) min    Current Presentation:   During the session, patient presented with irritable, angry, and sad mood. She was tearful during the session. She reports feeling, \"anger and crying.\" And made hopeless statements, \"I got nothing!\" Patient processed her  telling her she can no longer return home. They have been  for 30 years. Patient expressed anger and frustration with the healthcare system, and how it is unfair.  Patient endorses continued suicidal ideation. She stated she believes the intensity is approximately the same compared to on 10/26/23.  When asked if she feels it is safe for her to return home, patient stated, \"I don't think that would be a good idea.\" She expressed anger and irritation regarding how long she needs to wait for an inpatient psychiatry bed.    Additional Collateral Information:  N/a     Mental Status Exam     Affect: Labile   Appearance: Appropriate    Attention Span/Concentration: Attentive  Eye Contact: Engaged   Fund of Knowledge: Appropriate    Language /Speech Content: Fluent   Language /Speech Volume: Normal    Language /Speech Rate/Productions: Normal    Recent Memory: Intact   Remote Memory: Intact   Mood: Angry , Sad, Irritable  Orientation to Person: Yes    Orientation to Place: Yes   Orientation to Time of Day: Yes    Orientation to Date: Yes    Situation (Do " they understand why they are here?): Yes    Psychomotor Behavior: Normal    Thought Content: Delusions, Paranoia, and Suicidal   Thought Form: Obsessive/Perseverative and Tangential     Diagnosis:   Diagnosis          Patient Active Problem List   Diagnosis Code    Mixed incontinence N39.46    Asthma, moderate persistent J45.40    BMI 33.0-33.9,adult Z68.33    Chronic constipation K59.09    COPD (chronic obstructive pulmonary disease) (H) J44.9    COPD with chronic bronchitis J44.89    Diabetic neuropathy (H) E11.40    Hypercholesterolemia E78.00    Severe recurrent major depressive disorder with psychosis (H) F33.3    Tobacco use disorder F17.200    Type 2 diabetes mellitus (H) E11.9    Shortness of breath R06.02       Primary Problem This Admission  Active Hospital Problems    Severe recurrent major depressive disorder with psychosis (H)    Therapeutic Intervention(s):   Provided active listening, unconditional positive regard, and validation. Used motivational interviewing to increase change talk.    Treatment Objective(s) Addressed:   The focus of this session was on rapport building and processing feelings related to her  telling her she can no longer return and their 30 year marriage.       Progress Towards Goals:   Patient reports stable symptoms. Patient is not making progress towards treatment goals as evidenced by her .     Case Management:   None    Plan and Clinical Summary and Substantiation of Recommendations:   Inpatient Mental Health: At the time of assessment, writer recommends inpatient psychiatry for stabilization and symptom reduction. Patient continues to express paranoia, suicidal ideation with plans, and can not contract for safety if she discharges. A lower level of care is not appropriate at this time. Patient continues to be voluntary, and is on the wait list for an inpatient bed.    Attending concurs with disposition: Yes         [facility]al [facility]cd[facility]iel [facility]  10/28/23

## 2023-10-28 NOTE — ED NOTES
"Awake early. Reports that she had difficulty sleeping last night in recliner chair-currently c/o of lower back and buttocks pain. (Chronic issue for patient) Offered hot pack and given ibuprofen. Attempted to assess issues and thought process. Became tearful when writer attempted to discuss relationship with . \"He kicked me out and I will talk about my problems with the therapist-not just anyone-you are just the desk nurse\" Aware of plan to go inpatient. Admits to continued paranoid thinking -states this has been a life long issue/ she is not sure she wants to talk with writer about it because she does not trust writer at this point. Made aware of writers availability for her. Will continue to provide supportive contacts.   "

## 2023-10-29 ENCOUNTER — TELEPHONE (OUTPATIENT)
Dept: BEHAVIORAL HEALTH | Facility: CLINIC | Age: 53
End: 2023-10-29
Payer: COMMERCIAL

## 2023-10-29 PROCEDURE — 250N000013 HC RX MED GY IP 250 OP 250 PS 637: Performed by: PSYCHIATRY & NEUROLOGY

## 2023-10-29 PROCEDURE — G0378 HOSPITAL OBSERVATION PER HR: HCPCS

## 2023-10-29 PROCEDURE — 250N000013 HC RX MED GY IP 250 OP 250 PS 637: Performed by: NURSE PRACTITIONER

## 2023-10-29 RX ADMIN — SERTRALINE HYDROCHLORIDE 50 MG: 50 TABLET ORAL at 07:59

## 2023-10-29 RX ADMIN — ALBUTEROL SULFATE 2 PUFF: 90 AEROSOL, METERED RESPIRATORY (INHALATION) at 05:43

## 2023-10-29 RX ADMIN — GABAPENTIN 600 MG: 300 CAPSULE ORAL at 07:57

## 2023-10-29 RX ADMIN — METFORMIN HYDROCHLORIDE 500 MG: 500 TABLET, FILM COATED ORAL at 16:47

## 2023-10-29 RX ADMIN — IBUPROFEN 600 MG: 600 TABLET ORAL at 22:36

## 2023-10-29 RX ADMIN — ACETAMINOPHEN 650 MG: 325 TABLET, FILM COATED ORAL at 05:41

## 2023-10-29 RX ADMIN — GABAPENTIN 600 MG: 300 CAPSULE ORAL at 20:11

## 2023-10-29 RX ADMIN — HYDROXYZINE HYDROCHLORIDE 50 MG: 50 TABLET, FILM COATED ORAL at 16:37

## 2023-10-29 RX ADMIN — METFORMIN HYDROCHLORIDE 500 MG: 500 TABLET, FILM COATED ORAL at 07:59

## 2023-10-29 RX ADMIN — ATORVASTATIN CALCIUM 40 MG: 40 TABLET, FILM COATED ORAL at 20:11

## 2023-10-29 RX ADMIN — VARENICLINE TARTRATE 0.5 MG: 0.5 TABLET, FILM COATED ORAL at 07:58

## 2023-10-29 RX ADMIN — IBUPROFEN 600 MG: 600 TABLET ORAL at 07:58

## 2023-10-29 RX ADMIN — ARIPIPRAZOLE 5 MG: 5 TABLET ORAL at 07:58

## 2023-10-29 RX ADMIN — NICOTINE 1 PATCH: 21 PATCH, EXTENDED RELEASE TRANSDERMAL at 07:59

## 2023-10-29 RX ADMIN — FLUTICASONE FUROATE AND VILANTEROL 1 PUFF: 200; 25 POWDER RESPIRATORY (INHALATION) at 07:58

## 2023-10-29 RX ADMIN — GABAPENTIN 600 MG: 300 CAPSULE ORAL at 14:49

## 2023-10-29 ASSESSMENT — ACTIVITIES OF DAILY LIVING (ADL)
ADLS_ACUITY_SCORE: 35

## 2023-10-29 NOTE — PROGRESS NOTES
[facility] Progress Note    Client Name: Marsha Charles  Date: October 28, 2023  Service Type:  Group Therapy  Facilitator:me@ ASHLEY Parson        Response:  Patient did not participate in group.      Matthew Clinton

## 2023-10-29 NOTE — TELEPHONE ENCOUNTER
6:30 PM    Per call from Kaylah Fleming at [facility], pt has been accepted for IPMH with Dr. Juan ([facility] admitting).*Intake has called [facility] and [facility] nurse Duane  to notify. Duane was given the [facility] number for nurse to nurse.The pt. has been added to the queue, the admit board and all transfer items (Guarantor, indicia, bed planning, MH Internal checklist) complete.

## 2023-10-29 NOTE — ED NOTES
Pt woke up and got up a few times throughout the night. Made several requests. Pt was awake for at least few hours. She was able to fall back asleep after 6 am.

## 2023-10-29 NOTE — TELEPHONE ENCOUNTER
12:39 PM: Intake provided Radha at [facility] with Pt ALBERTO for review. Writer was informed that there are three referrals ahead for review.      [place] : [place]  Access Inpatient Bed Call Log 10/29/23 8:45 AM   Intake has called facilities that have not updated the bed status within the last 12 hours.                               [facility] is at capacity.              [facility] is [facility] 0 beds. [phone].    [facility] is posting 0 beds. Negative covid required.                 [facility] is posting 0 beds. Negative covid required. [phone]   [facility] is posting 0 bed. [phone]   [facility] is posting 0 beds. [phone]  [facility] is posting 0 beds.  [facility] ([facility]) is posting 0 beds.       [facility] is posting 2 beds. LOW acuity ONLY. Mixed unit 12+. Negative covid- [phone].     [facility] has 0 beds posted. No aggression. Negative Covid. Low acuity.      [facility] is posting 0 beds. Negative covid. [phone]   [facility] ([place] is posting 0 beds. Low acuity only. Negative covid.  [phone].    [facility] is posting 2 beds. Low acuity. No current aggression.     [facility] ([place]) is posting 0 beds available. Negative covid.  [phone].       [facility] is posting 1 bed. Low acuity. 72 HH hold preferred. Negative covid required. [phone]   [facility] ([place]) is posting 0 beds. Low acuity only. Negative covid.  [phone].    [facility] [place] is posting 6 beds. Negative covid required. Vol only, No history of aggression, violence, or assault. No sexual offenders. No 72 HH holds. [phone]       [facility] is posting 5 beds. Negative covid required.  (Must have the cognitive ability to do programming. No aggressive or violent behavior or recent HX in the last 2 yrs. MH must be primary.) Always low acuity.  [phone]    [facility] has 1 bed posted. Negative covid required.  Low acuity only. Violence and aggression capped.  [phone]. Low acuity only.     [facility] is posting 0 beds. Low acuity, Negative covid required. [phone] Per call @8:26am, at [facility] is posting 2 beds. Unit is a combined unit (14+). No aggressive patients. Voluntary only. Must be accompanied by a guardian.  Negative covid. [phone].    [facility] [place] posting 1 bed Negative covid required.  [phone]    [facility] [place] is posting 1 bed. Negative covid. LOW acuity. (No lines, drains, or tubes, oxygen, CPAP, IV, etc.). [phone]   [facility] is posting 16 beds. No covid test required.  [phone] Per call @8:30am, peds beds available and at [facility]  [facility] ([facility]) is posting 3 beds. Negative covid. (No. lines, drains, or tubes, oxygen, CPAP, IV, etc.). [phone] Per call @8:29am, currently reviewing     Pt remains on the work list pending appropriate bed availability.

## 2023-10-29 NOTE — ED PROVIDER NOTES
"EmPATH Unit - Psychiatric Consultation  [facility] Emergency Department    Marsha Charles MRN: [id number]   Age: 53 year old YOB: 1970     History     Chief Complaint   Patient presents with    Depression    Psychiatric Evaluation     HPI  Marsha Charles is a 53 year old female with history notable for  major depressive disorder, severe with psychosis who presented to the emergency department with increasing depression, paranoia, fixed delusions/visual hallucinations in the context of stopping medications. She was initially seen in the emergency department cleared medically and transferred to the EmPATH unit for further assessment, stabilization and treatment planning. She is nearing 9 hours in the emergency department.     She was last seen by me on 10/26/2023 at which time I noted the following:    Today on approach she was resting in a recliner, she startled awake when lightly roused by writer. She readily agreed to accompany me to a conference room for an interview. Throughout the interview, her eye contact was poor and she was crying off and on and her speech is halting. She said \"I just don't want to be afraid of people following me and watching me all of the time.\" Reportedly, she has been driving around late at night and put on 10k miles on their car in the last 30 days. There is a strong family history of paranoia and schizophrenia. She endorses feeling down, depressed and hopeless. She reports interrupted sleep. She eats one meal a day, which has been normal for her for many years. She drinks caffeine until her first and only meal of the day, which is around 2 pm. She reports passive suicidal thoughts. She reports that her  asked her to leave their home on Saturday and she has been staying with her mother in [place]. She reports like she is \"hitting rock bottom\", and although she expressed feeling anxious about transitioning to an inpatient bed, she knows she needs to do so. She denies " auditory hallucinations, endorses visual hallucinations (holes in the wall where the FBI are watching her), endorses delusions, and paranoia. She reports an increase in cigarette smoking (2.5 - 3 packs per day), she would like to restart Chantix which she said has helped her significantly in the past. She reports that she stopped medications in the past because of side effects; however, it is unclear low long of a trial medications were tried. She noted weight gain with risperidone, and one medication caused her to sleep so deeply that she was incontinent. She is open to trying a new antipsychotic while here and restarting escitalopram which was started in the past.    Treatment Plan:  - Place on list for inpatient admission  - Start escitalopram 10 mg daily  - Start aripiprazole 5 mg daily  - Continue prior to admission medications  - Chantix starter pack for smoking cessation  -Medication education provided this visit includes, rationale for medication, importance of compliance, medication interactions, and common side effects.   -Supportive psychotherapy provided regarding patient's stressors and past mental health symptoms problem solving ways to improve overall wellness and cope with acute and chronic mental health symptoms.  -Observe overnight and transition to an inpatient bed when available    On reassessment today she appears to have more insight into her delusions; however, there is perseveration on the specifics of the delusions, believing certain people are speaking badly about her, mistrusting the staff and the broader scope of medical practice,  and increased depression particularly around family dynamics and an understanding of the irrationality of her symptoms. She also repeatedly reports that she wants to smoke cigarettes and talks about  the lack of support she feels from her  and son regarding smoking cessation. She cried several times throughout the interview. The escitalopram was not  started, as it appeared to be an old medication from long ago. She agreed to try medications to help with her symptoms. She endorses that she has no intention of suicide while hospitalized and also adds several specific ways to die by suicide once out of the hospital e.g., jumping off a bridge, driving in front of a semi.     Past Medical History  Past Medical History:   Diagnosis Date    COPD (chronic obstructive pulmonary disease)     Diabetes mellitus - type 2     Hypercholesterolemia     Obesity     Uncomplicated asthma 2010     Past Surgical History:   Procedure Laterality Date    C/SECTION, LOW TRANSVERSE      x 2    CHOLECYSTECTOMY      COLONOSCOPY  2020    D & C      x 3    ENDOSCOPIC RELEASE CARPAL TUNNEL      GENITOURINARY SURGERY      HERNIA REPAIR, INCISIONAL      HYSTERECTOMY, VAGINAL      SLING TRANSPUBO WITHOUT ANTERIOR COLPORRHAPHY N/A 12/12/2016    Procedure: SLING TRANSPUBO WITHOUT ANTERIOR COLPORRHAPHY;  Surgeon: Luis Hanson MD;  Location: [facility] OR     albuterol (PROAIR HFA/PROVENTIL HFA/VENTOLIN HFA) 108 (90 BASE) MCG/ACT Inhaler  atorvastatin (LIPITOR) 40 MG tablet  benzonatate (TESSALON) 200 MG capsule  budesonide-formoterol (SYMBICORT) 160-4.5 MCG/ACT Inhaler  gabapentin (NEURONTIN) 100 MG capsule  ipratropium - albuterol 0.5 mg/2.5 mg/3 mL (DUONEB) 0.5-2.5 (3) MG/3ML neb solution  metFORMIN (GLUCOPHAGE) 1000 MG tablet  SUMAtriptan (IMITREX) 100 MG tablet  Varenicline Tartrate (CHANTIX PO)  escitalopram (LEXAPRO) 10 MG tablet  risperiDONE (RISPERDAL) 4 MG tablet      No Known Allergies  Family History  Family History   Problem Relation Age of Onset    Diabetes Mother     Hypertension Mother     Hyperlipidemia Mother     Other Cancer Father      Social History   Social History     Tobacco Use    Smoking status: Every Day     Packs/day: 1     Types: Cigarettes    Smokeless tobacco: Never    Tobacco comments:     30 yrs   Vaping Use    Vaping Use: Never used   Substance Use Topics     "Alcohol use: No    Drug use: No          Review of Systems  A medically appropriate review of systems was performed with pertinent positives and negatives noted in the HPI, and all other systems negative.    Physical Examination   BP: (!) 142/82  Pulse: 95  Temp: 98.7  F (37.1  C)  Resp: 18  Height: 162.6 cm (5' 4\")  Weight: 93.9 kg (207 lb)  SpO2: 98 %    Physical Exam  General: Appears stated age.   Neuro: Alert and fully oriented. Extremities appear to demonstrate normal strength on visual inspection.   Integumentary/Skin: no rash visualized, normal color    Psychiatric Examination   Appearance: awake, alert  Attitude:  cooperative and evasive  Eye Contact:  poor   Mood:  anxious and depressed  Affect:  mood congruent  Speech:  clear, coherent  Psychomotor Behavior:  no evidence of tardive dyskinesia, dystonia, or tics  Thought Process:  goal oriented and illogical  Associations:  no loose associations  Thought Content:  passive suicidal ideation present, obsessions present, and delusions present  Insight:  limited  Judgement:  limited  Oriented to:  time, person, and place  Attention Span and Concentration:  fair  Recent and Remote Memory:  fair  Language: able to name/identify objects without impairment  Fund of Knowledge: intact with awareness of current and past events    ED Course        Labs Ordered and Resulted from Time of ED Arrival to Time of ED Departure   BASIC METABOLIC PANEL - Abnormal       Result Value    Sodium 135      Potassium 4.4      Chloride 99      Carbon Dioxide (CO2) 25      Anion Gap 11      Urea Nitrogen 10.5      Creatinine 0.72      GFR Estimate >90      Calcium 9.2      Glucose 186 (*)    CBC WITH PLATELETS - Normal    WBC Count 8.3      RBC Count 4.35      Hemoglobin 14.3      Hematocrit 42.6      MCV 98      MCH 32.9      MCHC 33.6      RDW 13.1      Platelet Count 247     TSH WITH FREE T4 REFLEX - Normal    TSH 2.38     URINE DRUG SCREEN PANEL - Normal    Amphetamines Urine " Screen Negative      Barbituates Urine Screen Negative      Benzodiazepine Urine Screen Negative      Cannabinoids Urine Screen Negative      Cocaine Urine Screen Negative      Fentanyl Qual Urine Screen Negative      Opiates Urine Screen Negative      PCP Urine Screen Negative     ETHYL ALCOHOL LEVEL - Normal    Alcohol ethyl <0.01     COVID-19 VIRUS (CORONAVIRUS) BY PCR - Normal    SARS CoV2 PCR Negative         Assessments & Plan (with Medical Decision Making)   Patient presenting with increasing paranoia and visual hallucinations/delusions in the presence of severe major depression. Her symptoms have interrupted her life to a point that she appears to be a risk to herself and possibly inadvertently to others. She agrees to transition to an inpatient bed when available. Her decrease in function and symptoms today are severe enough that if she decided to leave prior to transitioning to an inpatient bed at this time, she should be placed on a 72-hour hold.  . Nursing notes reviewed noting no acute issues.     I have reviewed the assessment completed by the [facility].     Preliminary diagnosis:    ICD-10-CM    1. Paranoia (H)  F22       2. Severe recurrent major depressive disorder with psychosis (H)  F33.3       3. Tobacco use disorder  F17.200            Treatment Plan:  - Transfer to an inpatient bed when available.  - Start sertraline 5 mg daily to target depression and obsessive thoughts  -  Continue aripiprazole 5 mg daily  - Continue prior to admission medications  - Chantix starter pack for smoking cessation  -Medication education provided this visit includes, rationale for medication, importance of compliance, medication interactions, and common side effects.   -Supportive psychotherapy provided regarding patient's stressors and past mental health symptoms problem solving ways to improve overall wellness and cope with acute and chronic mental health symptoms.      --  [facility]EN [facility]  [facility] EMERGENCY DEPT  EmPATH Unit      Lisa Thorne, CARMEN CNP  10/28/23 [id number]

## 2023-10-29 NOTE — TELEPHONE ENCOUNTER
[place]: [place]  Access Inpatient Bed Call Log 10/29/23 @ 1:00am   Intake has called facilities that have not updated their bed status within the last 12 hours.    [facility]: @ cap per website.  [facility]:  @ cap per website. COVID negative test req. Lower acuity only  [facility]: @ cap per website. Low acuity only. Prefer med adjustments placement.  [facility]: @ cap per website.  No aggression     Pt remains on waitlist pending appropriate placement availability

## 2023-10-29 NOTE — PROGRESS NOTES
Patient says she feels a bit nervous, irritable. I administered Hydroxyzine X 1.  She denies SI/HI, AV hallucinations.

## 2023-10-29 NOTE — PROGRESS NOTES
Pt up on unit, showered, had AM medications, ordered breakfast. She voices no complaints, she is hyperactive and still has some paranoia. No SI.

## 2023-10-29 NOTE — ED NOTES
Pt. anxiously ruminating about if and when she will be able to transfer to an Inpatient . Asking multiple questions. Making multiple requests. Making and receiving multiple telephone calls. Writer has engaged in supportive contacts. Pt.admits to continued depression with hopelessness and suicidal ideation.

## 2023-10-29 NOTE — PROGRESS NOTES
[facility] Progress Note    Client Name: Marsha Charles  Date: October 29, 2023  Service Type:  Group Therapy  Facilitator: Dyana Bailey [facility]        Response:  Patient did not participate in group.      СВЕТЛАНА Barton

## 2023-10-29 NOTE — PROGRESS NOTES
Pt Mom brought clean cloths to June, items were placed in her locker. Pt has been asleep with no sign of distress. Mom reports pt has been calling her several times, and she feels pt is not getting better. She also reports once pt gets discharged, she stops taking her medications.Mom is thankful for pt care here.

## 2023-10-29 NOTE — PROGRESS NOTES
LMHP complete at referral to IP MH, with DEC; and, daily while awaiting IP MH placement. Call score to PPS.  CRITERIA SCORING   New 72 HH and Involuntary for IP MH (not adolescent) 0/1   Boarding over 24 hours 1/1   Vulnerable adult at least 55+ with multiple co morbidities; or, Patient age 11 or under 0/1   Suicide ideation without relief of precipitating factors 1/1   Current plan for suicide 0/1   Current plan for homicide 0/1   Imminent risk or actual attempt to seriously harm another without relief of factors precipitating the attempt 0/1   Severe dysfunction in daily living (ex: complete neglect for self care, extreme disruption in vegetative function, extreme deterioration in social interactions) 1/1   Recent (last 2 weeks) or current physical aggression in the ED 0/1   Restraints or seclusion episode in ED 0/1   Verbal aggression, agitation, yelling, etc., while in the ED 0/1   Active psychosis with psychomotor agitation or catatonia 1/1   Need for constant or near constant redirection (from leaving, from others, etc).  0/1   Intrusive or disruptive behaviors 0/1   TOTAL Acuity Total Score: 4

## 2023-10-30 ENCOUNTER — TELEPHONE (OUTPATIENT)
Dept: BEHAVIORAL HEALTH | Facility: CLINIC | Age: 53
End: 2023-10-30
Payer: COMMERCIAL

## 2023-10-30 PROCEDURE — 250N000013 HC RX MED GY IP 250 OP 250 PS 637: Performed by: NURSE PRACTITIONER

## 2023-10-30 PROCEDURE — G0378 HOSPITAL OBSERVATION PER HR: HCPCS

## 2023-10-30 PROCEDURE — 250N000013 HC RX MED GY IP 250 OP 250 PS 637: Performed by: PSYCHIATRY & NEUROLOGY

## 2023-10-30 RX ADMIN — LORAZEPAM 1 MG: 0.5 TABLET ORAL at 19:38

## 2023-10-30 RX ADMIN — ATORVASTATIN CALCIUM 40 MG: 40 TABLET, FILM COATED ORAL at 19:34

## 2023-10-30 RX ADMIN — GABAPENTIN 600 MG: 300 CAPSULE ORAL at 07:40

## 2023-10-30 RX ADMIN — VARENICLINE TARTRATE 0.5 MG: 0.5 TABLET, FILM COATED ORAL at 07:40

## 2023-10-30 RX ADMIN — VARENICLINE TARTRATE 0.5 MG: 0.5 TABLET, FILM COATED ORAL at 17:50

## 2023-10-30 RX ADMIN — SERTRALINE HYDROCHLORIDE 50 MG: 50 TABLET ORAL at 07:40

## 2023-10-30 RX ADMIN — ACETAMINOPHEN 650 MG: 325 TABLET, FILM COATED ORAL at 08:22

## 2023-10-30 RX ADMIN — ACETAMINOPHEN 650 MG: 325 TABLET, FILM COATED ORAL at 14:39

## 2023-10-30 RX ADMIN — GABAPENTIN 600 MG: 300 CAPSULE ORAL at 19:34

## 2023-10-30 RX ADMIN — ARIPIPRAZOLE 5 MG: 5 TABLET ORAL at 07:40

## 2023-10-30 RX ADMIN — ALBUTEROL SULFATE 2 PUFF: 90 AEROSOL, METERED RESPIRATORY (INHALATION) at 17:49

## 2023-10-30 RX ADMIN — METFORMIN HYDROCHLORIDE 500 MG: 500 TABLET, FILM COATED ORAL at 07:41

## 2023-10-30 RX ADMIN — METFORMIN HYDROCHLORIDE 500 MG: 500 TABLET, FILM COATED ORAL at 17:53

## 2023-10-30 RX ADMIN — GABAPENTIN 600 MG: 300 CAPSULE ORAL at 13:55

## 2023-10-30 RX ADMIN — HYDROXYZINE HYDROCHLORIDE 50 MG: 50 TABLET, FILM COATED ORAL at 16:08

## 2023-10-30 RX ADMIN — NICOTINE 1 PATCH: 21 PATCH, EXTENDED RELEASE TRANSDERMAL at 07:46

## 2023-10-30 RX ADMIN — FLUTICASONE FUROATE AND VILANTEROL 1 PUFF: 200; 25 POWDER RESPIRATORY (INHALATION) at 07:39

## 2023-10-30 ASSESSMENT — ACTIVITIES OF DAILY LIVING (ADL)
ADLS_ACUITY_SCORE: 35

## 2023-10-30 NOTE — PROGRESS NOTES
Pt informed she was accepted at [facility], and ETA is 7:45 pm tonight for transport. Her questions were answered, she verbalized understanding and agrees with plan. KRISTA signed, she is calling her Family to update them.

## 2023-10-30 NOTE — ED NOTES
Pt was sleeping on and off through the night, getting up a few times and going to the bathroom and to the kitchenette area. No signs of distress noted.

## 2023-10-30 NOTE — TELEPHONE ENCOUNTER
7:30 PM    Per [facility] nurse, pt is declining admit to [facility] and would like to change her preference to [facility] placement. Intake called [facility] and spoke with Kaylah Fleming to update. Admit board was updated and pt was removed from [facility] queue.    [place]: [place]  Access Inpatient Bed Call Log 10/29/23 9:56PM   Intake can search the [facility] for placement.                 [facility] is at capacity.              [facility] is posting 0 beds. [phone].   [facility] is posting 0 beds. Negative covid required.                 [facility] is posting 0 beds. Negative covid required. [phone]  [facility] is posting 0 bed. [phone]  [facility] is posting 0 beds. [phone].   [facility] is posting 0 beds. Negative covid. [phone]  [facility] is posting 0 beds.  [facility] is posting 0 beds.    Pt remains on the work list pending appropriate bed availability.

## 2023-10-30 NOTE — TELEPHONE ENCOUNTER
R: [place]  Access Inpatient Bed Call Log 10/30/23 @ 1:00am   Intake has called facilities that have not updated their bed status within the last 12 hours.??     *[facility]:  [place] -- [facility]: @ cap per website.  [place] -- [facility]:  @ cap per website.  [place] -- [facility]: @ cap per website.  [place] -- [facility]:  @ cap per RN. Open for review, low acuity only.  [place] -- [facility]: @ cap per website.  [facility] -- [facility]: @ cap per website.  [facility] - @ cap per website. Ages 18-25, Voluntary only, COVID test req'd, NO aggression, physical or sexual assault, violence hx or drug abuse  [place] -- [facility]: @ cap per website.  [place] -- [facility]: @ cap per website.  [place] -- [facility]: @ cap per website.     Pt remains on waitlist pending appropriate placement availability

## 2023-10-30 NOTE — PROGRESS NOTES
"[facility] Progress Note    Client Name: Marsha Charles  Date: October 30, 2023  Service Type:  Group Therapy  Session Start Time:  100  Session End Time: 1030  Session Length: 30  Attendees: Patient and other group members  Facilitator: СВЕТЛАНА Chin     Topic:   Identify goals for the day; share a challenging and positive experience from the past 24 hours; participated in Three Animals activity    Intervention:    Group process: support, challenge, affirm, psycho-education.     Response:  Patient did participate in group. Behavior in group was engaged, guarded, and slightly irritable. Patient shared her goals for the day to be \"get my life back on track\" and work on her anger management \"because I have a short fuse.\"  Her challenging experience in the past 24 hours is trying to get an inpatient mental health bed however is worried about what insurance will cover.  Patient identifies turning down a bed last night that was in [place] due to several hour ambulance ride. Patient's states she has not had a positive experience to share.     The Three Animals activity has patients identify their three favorite animals and what qualities they like about those animals.  These qualities of those animals are meant to represent how patient wants others to see them, how patient actually sees themself, and who the patient really is.  Patient's animals were elephant, dog, and cat.     СВЕТЛАНА Chin        "

## 2023-10-30 NOTE — TELEPHONE ENCOUNTER
"10:52 AM Intake received message from EC \"JS at [facility], would like to expand search for inpatient back to [place] please.\"    11:04 AM Call to [facility] to confirm placement preferences and nurse advised she is willing to go anywhere even though [facility] was declined yesterday.    11:08 AM LVM for [facility].  11:17 AM Per call to [facility] willing to review for admission.  11:35 AM Per  call to [facility] will review admission and callback.  11:51 AM Per callback from [facility] at [facility] pt accepted [facility]- anytime for nurse report [phone].  12:02 PM ED notified.  12:39 PM Email sent.  12:43 PM Indicia completed.  3:00 PM Per follow up call to [facility] patients transport is scheduled for 10/30 7:45 pm.    [place]: [place]  Access Inpatient Bed Call Log 10/30/23 8:22 AM    Intake has called facilities that have not updated the bed status within the last 12 hours.                     [facility] is at capacity.               [facility] is posting 0 beds. [phone].     [facility] is posting 0 beds. Negative covid required.                  [facility] is posting 0 beds. Negative covid required. [phone]. 10/30 Per call at 8:41 am to [facility] currently at capacity can callback this afternoon.   [facility] is posting 0 bed. [phone]    [facility] is posting 0 beds. [phone].     [facility] is posting 1 beds. Negative covid. [phone]. 10/30 Pt not appropriate d/t facility restrictions.  [facility] is posting 0 beds.   [facility] is posting 0 beds.      [facility] is posting 0 bed. LOW acuity ONLY. Mixed unit 12+. Negative covid- [phone].      [facility] has 0 beds posted. No aggression. Negative Covid. Low acuity.       [facility] is posting 0 beds. Negative covid. [phone]    [facility] is posting 0 beds. Low acuity only. Negative covid.  [phone].     [facility] is posting 2 beds. Low acuity. No " current aggression. 10/30 Per call at 11:14 am  to Kiesha currently at capacity to callback after 1 pm.  [facility] ([place]) is posting 0 beds available. Negative covid.  [phone].        [facility] is posting 1 bed. Low acuity. 72 HH hold preferred. Negative covid required. [phone]  10/30 Per call at 11:17 am to Rubén can send clinical to screen.  [facility] ([facility]) is posting 0 beds. Low acuity only. Negative covid.  [phone].     [facility] [place] is posting 5 beds. Negative covid required. Vol only, No history of aggression, violence, or assault. No sexual offenders. No 72 HH holds. [phone]  10/30 Per call at 11:32 am  to Rachele unable to screen patients to check back after 3 pm.     [facility] is posting 3 beds. Negative covid required.  (Must have the cognitive ability to do programming. No aggressive or violent behavior or recent HX in the last 2 yrs. MH must be primary.) Always low acuity. [phone]     [facility] has 0 bed posted. Negative covid required.  Low acuity only. Violence and aggression capped.  [phone]. Low acuity only.10/30 Per call at 8:42 am to Gamal to callback in one hour d/t meeting.   [facility] is posting 0 beds. Low acuity, Negative covid required. [phone]. 10/30 Per call at 8:43 am to Yue no availability, however, can be added to referral list.   [facility] is posting 2 beds. Unit is a combined unit (14+). No aggressive patients. Voluntary only. Must be accompanied by a guardian.  Negative covid. [phone].     [facility] posting 1 bed Negative covid required.  [phone]     [facility] is posting 4 beds. Negative covid. LOW acuity. (No lines, drains, or tubes, oxygen, CPAP, IV, etc.). [phone]    [facility] is posting 0 beds. No covid test required.  [phone]. 10/30 Per call at 8:44am to  Christina 10 kid beds available and no adults.   [facility] ([facility]) is posting 1 bed Negative covid. (No. lines, drains, or tubes, oxygen, CPAP, IV, etc.). [phone]. 10/30 Per call at 8:46am to Nikki one low acuity bed available.     Patient remains on the work list pending appropriate bed availability.

## 2023-10-30 NOTE — PROGRESS NOTES
Writer attempted to meet with patient for supportive check in, she declined to meet with writer and states she is looking forward to her inpatient admission.     JIMENA JaureguiW MSW- Intern

## 2023-10-30 NOTE — ED PROVIDER NOTES
End of shift report:  Patient boarding on E[facility] while awaiting inpatient placement for psychosis.  Patient has been in the ED setting for approximately 81 hours.  Due to time constraints and acuity of the unit, unable to meet with patient.  Chart reviewed noting no immediate concerns or issues.   Patient assessed today by LM with no acute concerns or recommendation in change of disposition.  Psychiatrically assessed by CARMEN Flores, CNP yesterday 10/28/23, noting patient met criteria for involuntary hold if she were to request to leave. Zoloft 50 mg daily started to target obsessive thoughts.  -Nursing reports no acute issues. Patient tolerating medications without reported side effects, sleeping fairly well given accommodations, and eating most of meals.  Thus far, patient remains agreeable to inpatient transfer.     Mariza Rodriguez, APRN CNP  10/29/23 [id number]

## 2023-10-30 NOTE — PROGRESS NOTES
Pt reports she refused transferring to [facility] last night because she was worried about the distance, and her insurance not paying for the ambulance ride. She was observed making phone calls this AM, she reports to writer she wants back on the list to look for beds out of the [facility], and is willing to transfer where a bed is available. Central intake is aware.

## 2023-10-30 NOTE — PROGRESS NOTES
Call from intake, pt accepted by Dr Bradshaw @ [address]. Report was called to [facility] [phone]. Transport called, they will call back with ETA.

## 2023-10-30 NOTE — PROGRESS NOTES
Pt visible on unit and resting in recliner. Pt frequently seeking out staff and expressed anxiety about insurance to writer multiple times during the evening and plans on connecting with her  who will reach out to insurance to understand coverage. Pt denies SI, HI, and hallucinations.

## 2023-10-30 NOTE — TELEPHONE ENCOUNTER
R: [place]  Access Inpatient Bed Call Log 10/30/23 8:22 AM    Intake has called facilities that have not updated the bed status within the last 12 hours. ([facility])                    [facility] is at capacity.               [facility] is posting 0 beds. [phone].     [facility] is posting 0 beds. Negative covid required.                  [facility] is posting 0 beds. Negative covid required. [phone]. 10/30 Per call at 8:41 am to [facility] currently at capacity can callback this afternoon.   [facility] is posting 0 bed. [phone]    [facility] is posting 0 beds. [phone].     [facility] Negative covid. [phone]. 10/30 Pt not appropriate d/t facility restriction.   [facility] is posting 0 beds.   [facility] is posting 0 beds.      Patient remains on the work list pending appropriate bed availability.

## 2023-10-30 NOTE — PROGRESS NOTES
Pt accepted at [facility], but was not comfortable going outside of the [facility] due to concerns about the cost of transport with her insurance. Pt requested to meet with a provider tomorrow and be put on the inpatient list for [facility] only. Pt encouraged to reach out to her insurance for clarity regarding costs associated with her care as nursing staff could not advise her on the cost of care and what is covered by her insurance. Pt expressed understanding.

## 2023-10-31 ENCOUNTER — HOSPITAL ENCOUNTER (INPATIENT)
Facility: HOSPITAL | Age: 53
LOS: 3 days | Discharge: HOME | DRG: 885 | End: 2023-11-03
Attending: STUDENT IN AN ORGANIZED HEALTH CARE EDUCATION/TRAINING PROGRAM | Admitting: STUDENT IN AN ORGANIZED HEALTH CARE EDUCATION/TRAINING PROGRAM
Payer: COMMERCIAL

## 2023-10-31 VITALS
HEART RATE: 95 BPM | RESPIRATION RATE: 18 BRPM | TEMPERATURE: 98.1 F | SYSTOLIC BLOOD PRESSURE: 107 MMHG | DIASTOLIC BLOOD PRESSURE: 79 MMHG | OXYGEN SATURATION: 95 % | WEIGHT: 182.1 LBS | BODY MASS INDEX: 31.09 KG/M2 | HEIGHT: 64 IN

## 2023-10-31 DIAGNOSIS — E11.9 TYPE 2 DIABETES MELLITUS WITHOUT COMPLICATION, WITHOUT LONG-TERM CURRENT USE OF INSULIN (H): ICD-10-CM

## 2023-10-31 DIAGNOSIS — F17.200 TOBACCO USE DISORDER: ICD-10-CM

## 2023-10-31 DIAGNOSIS — G43.719 INTRACTABLE CHRONIC MIGRAINE WITHOUT AURA AND WITHOUT STATUS MIGRAINOSUS: ICD-10-CM

## 2023-10-31 DIAGNOSIS — F33.3 SEVERE EPISODE OF RECURRENT MAJOR DEPRESSIVE DISORDER, WITH PSYCHOTIC FEATURES (H): Primary | ICD-10-CM

## 2023-10-31 DIAGNOSIS — J44.89 COPD WITH CHRONIC BRONCHITIS (H): ICD-10-CM

## 2023-10-31 DIAGNOSIS — E78.00 HYPERCHOLESTEROLEMIA: ICD-10-CM

## 2023-10-31 PROBLEM — F32.9 MAJOR DEPRESSIVE DISORDER: Status: ACTIVE | Noted: 2023-10-31

## 2023-10-31 LAB
EST. AVERAGE GLUCOSE BLD GHB EST-MCNC: 154 MG/DL
GLUCOSE BLDC GLUCOMTR-MCNC: 135 MG/DL (ref 70–99)
GLUCOSE BLDC GLUCOMTR-MCNC: 222 MG/DL (ref 70–99)
GLUCOSE BLDC GLUCOMTR-MCNC: 84 MG/DL (ref 70–99)
HBA1C MFR BLD: 7 %
HOLD SPECIMEN: NORMAL
HOLD SPECIMEN: NORMAL

## 2023-10-31 PROCEDURE — G0378 HOSPITAL OBSERVATION PER HR: HCPCS

## 2023-10-31 PROCEDURE — 250N000013 HC RX MED GY IP 250 OP 250 PS 637: Performed by: NURSE PRACTITIONER

## 2023-10-31 PROCEDURE — 99223 1ST HOSP IP/OBS HIGH 75: CPT | Mod: AI | Performed by: STUDENT IN AN ORGANIZED HEALTH CARE EDUCATION/TRAINING PROGRAM

## 2023-10-31 PROCEDURE — 99222 1ST HOSP IP/OBS MODERATE 55: CPT | Performed by: NURSE PRACTITIONER

## 2023-10-31 PROCEDURE — 250N000009 HC RX 250: Performed by: NURSE PRACTITIONER

## 2023-10-31 PROCEDURE — 250N000013 HC RX MED GY IP 250 OP 250 PS 637: Performed by: STUDENT IN AN ORGANIZED HEALTH CARE EDUCATION/TRAINING PROGRAM

## 2023-10-31 PROCEDURE — 36415 COLL VENOUS BLD VENIPUNCTURE: CPT | Performed by: NURSE PRACTITIONER

## 2023-10-31 PROCEDURE — 124N000001 HC R&B MH

## 2023-10-31 PROCEDURE — 83036 HEMOGLOBIN GLYCOSYLATED A1C: CPT | Performed by: NURSE PRACTITIONER

## 2023-10-31 PROCEDURE — 250N000013 HC RX MED GY IP 250 OP 250 PS 637: Performed by: PSYCHIATRY & NEUROLOGY

## 2023-10-31 PROCEDURE — 94640 AIRWAY INHALATION TREATMENT: CPT

## 2023-10-31 PROCEDURE — 250N000012 HC RX MED GY IP 250 OP 636 PS 637: Performed by: NURSE PRACTITIONER

## 2023-10-31 RX ORDER — OLANZAPINE 10 MG/2ML
10 INJECTION, POWDER, FOR SOLUTION INTRAMUSCULAR 3 TIMES DAILY PRN
Status: DISCONTINUED | OUTPATIENT
Start: 2023-10-31 | End: 2023-11-03 | Stop reason: HOSPADM

## 2023-10-31 RX ORDER — IPRATROPIUM BROMIDE AND ALBUTEROL SULFATE 2.5; .5 MG/3ML; MG/3ML
1 SOLUTION RESPIRATORY (INHALATION) 3 TIMES DAILY
Status: ON HOLD | COMMUNITY
End: 2023-11-02

## 2023-10-31 RX ORDER — BUDESONIDE, GLYCOPYRROLATE, AND FORMOTEROL FUMARATE 160; 9; 4.8 UG/1; UG/1; UG/1
2 AEROSOL, METERED RESPIRATORY (INHALATION) 2 TIMES DAILY
Status: ON HOLD | COMMUNITY
End: 2023-10-31

## 2023-10-31 RX ORDER — ACETAMINOPHEN 500 MG
1500 TABLET ORAL DAILY
COMMUNITY

## 2023-10-31 RX ORDER — GABAPENTIN 600 MG/1
600 TABLET ORAL 3 TIMES DAILY
Status: DISCONTINUED | OUTPATIENT
Start: 2023-10-31 | End: 2023-11-03 | Stop reason: HOSPADM

## 2023-10-31 RX ORDER — ATORVASTATIN CALCIUM 40 MG/1
40 TABLET, FILM COATED ORAL DAILY
Status: DISCONTINUED | OUTPATIENT
Start: 2023-10-31 | End: 2023-11-03 | Stop reason: HOSPADM

## 2023-10-31 RX ORDER — DEXTROSE MONOHYDRATE 25 G/50ML
25-50 INJECTION, SOLUTION INTRAVENOUS
Status: DISCONTINUED | OUTPATIENT
Start: 2023-10-31 | End: 2023-11-03 | Stop reason: HOSPADM

## 2023-10-31 RX ORDER — ARIPIPRAZOLE 5 MG/1
5 TABLET ORAL DAILY
Status: ON HOLD | COMMUNITY
End: 2023-11-02

## 2023-10-31 RX ORDER — ACETAMINOPHEN 325 MG/1
650 TABLET ORAL EVERY 4 HOURS PRN
Status: DISCONTINUED | OUTPATIENT
Start: 2023-10-31 | End: 2023-11-03 | Stop reason: HOSPADM

## 2023-10-31 RX ORDER — OLANZAPINE 10 MG/1
10 TABLET ORAL 3 TIMES DAILY PRN
Status: DISCONTINUED | OUTPATIENT
Start: 2023-10-31 | End: 2023-11-03 | Stop reason: HOSPADM

## 2023-10-31 RX ORDER — ARIPIPRAZOLE 5 MG/1
5 TABLET ORAL DAILY
Status: DISCONTINUED | OUTPATIENT
Start: 2023-11-01 | End: 2023-11-01

## 2023-10-31 RX ORDER — IBUPROFEN 600 MG/1
600 TABLET, FILM COATED ORAL EVERY 8 HOURS PRN
Status: DISCONTINUED | OUTPATIENT
Start: 2023-10-31 | End: 2023-11-03 | Stop reason: HOSPADM

## 2023-10-31 RX ORDER — SUMATRIPTAN 25 MG/1
100 TABLET, FILM COATED ORAL
Status: DISCONTINUED | OUTPATIENT
Start: 2023-10-31 | End: 2023-11-03 | Stop reason: HOSPADM

## 2023-10-31 RX ORDER — IBUPROFEN 600 MG/1
600 TABLET, FILM COATED ORAL 3 TIMES DAILY PRN
COMMUNITY

## 2023-10-31 RX ORDER — SERTRALINE HYDROCHLORIDE 100 MG/1
100 TABLET, FILM COATED ORAL DAILY
Status: DISCONTINUED | OUTPATIENT
Start: 2023-11-01 | End: 2023-11-03 | Stop reason: HOSPADM

## 2023-10-31 RX ORDER — NICOTINE 21 MG/24HR
1 PATCH, TRANSDERMAL 24 HOURS TRANSDERMAL DAILY
Status: DISCONTINUED | OUTPATIENT
Start: 2023-11-01 | End: 2023-11-03 | Stop reason: HOSPADM

## 2023-10-31 RX ORDER — HYDROXYZINE HYDROCHLORIDE 25 MG/1
25 TABLET, FILM COATED ORAL EVERY 4 HOURS PRN
Status: DISCONTINUED | OUTPATIENT
Start: 2023-10-31 | End: 2023-11-03 | Stop reason: HOSPADM

## 2023-10-31 RX ORDER — ALBUTEROL SULFATE 90 UG/1
2 AEROSOL, METERED RESPIRATORY (INHALATION) EVERY 4 HOURS PRN
Status: DISCONTINUED | OUTPATIENT
Start: 2023-10-31 | End: 2023-11-03 | Stop reason: HOSPADM

## 2023-10-31 RX ORDER — FLUTICASONE FUROATE AND VILANTEROL 200; 25 UG/1; UG/1
1 POWDER RESPIRATORY (INHALATION) DAILY
Status: DISCONTINUED | OUTPATIENT
Start: 2023-11-01 | End: 2023-11-03 | Stop reason: HOSPADM

## 2023-10-31 RX ORDER — METFORMIN HCL 500 MG
2000 TABLET, EXTENDED RELEASE 24 HR ORAL DAILY
Status: ON HOLD | COMMUNITY
End: 2023-10-31

## 2023-10-31 RX ORDER — CYCLOBENZAPRINE HCL 10 MG
10 TABLET ORAL 3 TIMES DAILY PRN
Status: ON HOLD | COMMUNITY
End: 2023-11-02

## 2023-10-31 RX ORDER — MAGNESIUM HYDROXIDE/ALUMINUM HYDROXICE/SIMETHICONE 120; 1200; 1200 MG/30ML; MG/30ML; MG/30ML
30 SUSPENSION ORAL EVERY 4 HOURS PRN
Status: DISCONTINUED | OUTPATIENT
Start: 2023-10-31 | End: 2023-11-03 | Stop reason: HOSPADM

## 2023-10-31 RX ORDER — IPRATROPIUM BROMIDE AND ALBUTEROL SULFATE 2.5; .5 MG/3ML; MG/3ML
1 SOLUTION RESPIRATORY (INHALATION) 3 TIMES DAILY
Status: DISCONTINUED | OUTPATIENT
Start: 2023-10-31 | End: 2023-11-03 | Stop reason: HOSPADM

## 2023-10-31 RX ORDER — NICOTINE POLACRILEX 4 MG
15-30 LOZENGE BUCCAL
Status: DISCONTINUED | OUTPATIENT
Start: 2023-10-31 | End: 2023-11-03 | Stop reason: HOSPADM

## 2023-10-31 RX ADMIN — VARENICLINE TARTRATE 0.5 MG: 0.5 TABLET, FILM COATED ORAL at 07:46

## 2023-10-31 RX ADMIN — FLUTICASONE FUROATE AND VILANTEROL 1 PUFF: 200; 25 POWDER RESPIRATORY (INHALATION) at 07:45

## 2023-10-31 RX ADMIN — IPRATROPIUM BROMIDE AND ALBUTEROL SULFATE 3 ML: .5; 3 SOLUTION RESPIRATORY (INHALATION) at 20:10

## 2023-10-31 RX ADMIN — ACETAMINOPHEN 650 MG: 325 TABLET, FILM COATED ORAL at 04:40

## 2023-10-31 RX ADMIN — METFORMIN HYDROCHLORIDE 1000 MG: 500 TABLET, FILM COATED ORAL at 17:21

## 2023-10-31 RX ADMIN — ACETAMINOPHEN 650 MG: 325 TABLET, FILM COATED ORAL at 07:46

## 2023-10-31 RX ADMIN — INSULIN ASPART 2 UNITS: 100 INJECTION, SOLUTION INTRAVENOUS; SUBCUTANEOUS at 17:21

## 2023-10-31 RX ADMIN — OLANZAPINE 10 MG: 10 TABLET, FILM COATED ORAL at 18:08

## 2023-10-31 RX ADMIN — NICOTINE 1 PATCH: 21 PATCH, EXTENDED RELEASE TRANSDERMAL at 07:46

## 2023-10-31 RX ADMIN — ALBUTEROL SULFATE 2 PUFF: 90 AEROSOL, METERED RESPIRATORY (INHALATION) at 04:41

## 2023-10-31 RX ADMIN — METFORMIN HYDROCHLORIDE 1000 MG: 500 TABLET, FILM COATED ORAL at 07:45

## 2023-10-31 RX ADMIN — GABAPENTIN 600 MG: 600 TABLET, FILM COATED ORAL at 20:31

## 2023-10-31 RX ADMIN — ACETAMINOPHEN 650 MG: 325 TABLET, FILM COATED ORAL at 20:31

## 2023-10-31 RX ADMIN — GABAPENTIN 600 MG: 300 CAPSULE ORAL at 07:46

## 2023-10-31 RX ADMIN — ATORVASTATIN CALCIUM 40 MG: 40 TABLET, FILM COATED ORAL at 14:49

## 2023-10-31 RX ADMIN — SERTRALINE HYDROCHLORIDE 50 MG: 50 TABLET ORAL at 07:47

## 2023-10-31 RX ADMIN — IBUPROFEN 600 MG: 600 TABLET, FILM COATED ORAL at 14:25

## 2023-10-31 RX ADMIN — ARIPIPRAZOLE 5 MG: 5 TABLET ORAL at 07:45

## 2023-10-31 ASSESSMENT — ACTIVITIES OF DAILY LIVING (ADL)
DOING_ERRANDS_INDEPENDENTLY_DIFFICULTY: NO
VISION_MANAGEMENT: WEARS GLASSES
ADLS_ACUITY_SCORE: 35
CHANGE_IN_FUNCTIONAL_STATUS_SINCE_ONSET_OF_CURRENT_ILLNESS/INJURY: NO
DRESSING/BATHING_DIFFICULTY: NO
ADLS_ACUITY_SCORE: 30
ADLS_ACUITY_SCORE: 30
ADLS_ACUITY_SCORE: 35
FALL_HISTORY_WITHIN_LAST_SIX_MONTHS: YES
ADLS_ACUITY_SCORE: 30
ADLS_ACUITY_SCORE: 30
CONCENTRATING,_REMEMBERING_OR_MAKING_DECISIONS_DIFFICULTY: NO
ADLS_ACUITY_SCORE: 35
DIFFICULTY_COMMUNICATING: NO
DRESS: SCRUBS (BEHAVIORAL HEALTH)
WALKING_OR_CLIMBING_STAIRS_DIFFICULTY: NO
ADLS_ACUITY_SCORE: 30
ADLS_ACUITY_SCORE: 43
NUMBER_OF_TIMES_PATIENT_HAS_FALLEN_WITHIN_LAST_SIX_MONTHS: 1
HYGIENE/GROOMING: INDEPENDENT
ADLS_ACUITY_SCORE: 35
HEARING_DIFFICULTY_OR_DEAF: NO
TOILETING_ISSUES: NO
WEAR_GLASSES_OR_BLIND: YES
DIFFICULTY_EATING/SWALLOWING: NO

## 2023-10-31 ASSESSMENT — LIFESTYLE VARIABLES: SKIP TO QUESTIONS 9-10: 0

## 2023-10-31 NOTE — PLAN OF CARE
"  Problem: Adult Behavioral Health Plan of Care  Goal: Patient-Specific Goal (Individualization)  Description: Patient will sleep 6 to 8 hours per night  Patient will eat at least 50% of meals  Patient will attend at least 50% of groups  Patient will comply with recommendations of treatment team  Patient will remain medication compliant  Patient will report decrease in paranoia and delusions  Patient will be free from self harm or injury    Outcome: Progressing    Alert, disorientated to situation.   Pt requests to discharge this writer attempts to talk with pt to find out why she wants to leave as she has only arrived a few hours ago.  Speech is pressured and loud and argumentative. Pt asks questions then refuses to allow answers. Pt states \"you all just want to control me. This is about control.\" This writer verbalizes that it's about safety not control.  Suggest she wait to talk with MD as she could be placed on a 72 hour hold if MD feels she is not safe for discharge. Pt states she wants to sign 12 hr intent to leave unit---MD notified.   Pt in room, this writer enters to obtain BG. Pt cooperative though irritable.   Paranoid--Pt states \"I brought myself into the hospital and now I'm punished for doing so. It's not my fault they are following me.\" This writer reinforces we are here to help with what brought her to hospital.   This writer asks who is following her. Pt states \"the government. They have been following me everywhere. This writer asks pt why the government would want to follow her. Pt states \"I don't know but when I look up jackelyn see 15 airplanes in the [place] following me, surveying me that's the problem. I don't know why they are doing this. \"  Pt talks with Dr. Juan via Ipad.   This writer brings pt her 72 hr hold and rights paperwork.   Pt then verbalizes that all the other patients are watching her and talking about her. Pt also states she will not tolerate her roommate. \"She has been in and out " "the bathroom every 5 minutes and she needs to stop. I'm not listening to her music either. I want a private room and everybody to stop talking about me.\"   Pt accepts Zyprexa 10 mg for agitation.  Pt moved to  ICU  for decreased stimulation and high level of paranoia she has expressed about her peers.   Cooperative with HS medicaition and neb treatment.      Problem: Suicide Risk  Goal: Absence of Self-Harm  Outcome: Progressing     Problem: Thought Process Alteration  Goal: Optimal Thought Clarity  Outcome: Progressing   Goal Outcome Evaluation:    Plan of Care Reviewed With: patient          Face to face end of shift report communicated to oncoming shift.     Gretel Beck RN  10/31/2023  9:21 PM               "
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
## 2023-10-31 NOTE — DISCHARGE INSTRUCTIONS
Behavioral Discharge Planning and Instructions    Summary:   This is a 53 year old female with a PMH of MDD with psychotic features who presents in a depressive episode with SI and psychosis, namely paranoia occurring in the setting of relationship stressors     Main Diagnosis:    1. Unspecified mood disorder (MDD with psychosis vs SZAD, depressive type)  2. Tobacco use disorder, moderate     Health Care Follow-up:     Referral was sent here. Please call to follow up.  [facility]   [id number],   [place] [id number] [phone]    Attend all scheduled appointments with your outpatient providers. Call at least 24 hours in advance if you need to reschedule an appointment to ensure continued access to your outpatient providers.     Major Treatments, Procedures and Findings:  You were provided with: a psychiatric assessment, assessed for medical stability, medication evaluation and/or management, group therapy, family therapy, individual therapy, CD evaluation/assessment, milieu management, and medical interventions    Symptoms to Report: feeling more aggressive, increased confusion, losing more sleep, mood getting worse, or thoughts of suicide    Early warning signs can include: increased depression or anxiety sleep disturbances increased thoughts or behaviors of suicide or self-harm  increased unusual thinking, such as paranoia or hearing voices    Safety and Wellness:  Take all medicines as directed.  Make no changes unless your doctor suggests them.      Follow treatment recommendations.  Refrain from alcohol and non-prescribed drugs.  Ask your support system to help you reduce your access to items that could harm yourself or others. Items could include:  Firearms  Medicines (both prescribed and over-the-counter)  Knives and other sharp objects  Ropes and like materials  Car keys  If there is a concern for safety, call 911. If there is a concern for safety, call 911.    Resources:  "  Crisis Intervention: 719.226.6136 or 911-377-1062 (TTY: 134.635.3330).  Call anytime for help.  National [place] on Mental Illness (www.[place].david.org): 103.862.4132 or 801-449-6958.  [place] Association for Children's Mental Health (www.mac.org): 396.629.2428.  Alcoholics Anonymous (www.alcoholics-anonymous.org): Check your phone book for your local chapter.  Suicide Awareness Voices of Education (SAVE) (www.save.org): 078-163-PLIL (9141)  National Suicide Prevention Line (www.mentalhealthmn.org): 340-832-GGGM (2554)  Mental Health Consumer/Survivor Network of [place] (www.mhcsn.net): 548.193.2352 or 411-799-9693  Mental Health Association of [place] (www.mentalhealth.org): 339.447.6158 or 944-545-3276  Self- Management and Recovery Training., SMART-- Toll free: 595.305.7459  www.[employer]  Text 4 Life: txt \"LIFE\" to 24645 for immediate support and crisis intervention  Crisis text line: Text \"[place]\" to 485069. Free, confidential, 24/7.  Crisis Intervention: 414.446.8423 or 440-240-5173. Call anytime for help.     General Medication Instructions:   See your medication sheet(s) for instructions.   Take all medicines as directed.  Make no changes unless your doctor suggests them.   Go to all your doctor visits.  Be sure to have all your required lab tests. This way, your medicines can be refilled on time.  Do not use any drugs not prescribed by your doctor.  Avoid alcohol.    Advance Directives:   Scanned document on file with [facility]?    Is document scanned?    Honoring Choices Your Rights Handout:    Was more information offered?      The Treatment team has appreciated the opportunity to work with you. If you have any questions or concerns about your recent admission, you can contact the unit which can receive your call 24 hours a day, 7 days a week. They will be able to get in touch with a Provider if needed. The unit number is *** .  "

## 2023-10-31 NOTE — PROGRESS NOTES
Pt visible on unit throughout evening in recliner. Pt expressed anxiety towards her transfer, but is ready to go inpatient. PRN ativan given - see MAR. Pt is cooperative. VSS.

## 2023-10-31 NOTE — PROGRESS NOTES
Pt awake with no complaints, received AM meds and ate breakfast, waiting for transport to [facility] this AM.

## 2023-10-31 NOTE — ED NOTES
Pt slept on and off, waking up a few times. Pt made several requests and asked if she was still leaving this morning. This writer let patient know that the plan has not changed and she should be leaving at around 7:45 am. Pt appeared anxious and expressed a concern of not getting picked up and transported to [facility] this morning.
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
## 2023-10-31 NOTE — PROGRESS NOTES
10/31/23 1413   Patient Belongings   Did you bring any home meds/supplements to the hospital?  Yes   Disposition of meds  Sent to security/pharmacy per site process   Patient Belongings locker;remains with patient  (Glasses)   Patient Belongings Put in Hospital Secure Location (Security or Locker, etc.) briefcase;clothing;medication(s);shoes   Belongings Search Yes   Clothing Search Yes   Second Staff Sejal HUERTA   Comment 3 puzzle books, 6 pads, 6 pkg of gum, hygeine products from other hospital, 2x shoes, 4x sweatshirts, 3x long sleeves, 1 bra, 6 pairs of socks, 12 underwears, 6 shirts, 7 pants, 1 tank top, 12 underwhere,6 pairs of socks     List items sent to safe: NONE    Addendum: enter 12 underwear and 6 socks twice. Forgot to add chap stix   All other belongings put in assigned cubby in belongings room.     Contraband: pair of scissors to security       I have reviewed my belongings list on admission and verify that it is correct.     Patient signature_______________________________    Second staff witness (if patient unable to sign) ______________________________       I have received all my belongings at discharge.    Patient signature________________________________    Ebonie  10/31/2023  2:26 PM

## 2023-10-31 NOTE — PROGRESS NOTES
[facility] Progress Note    Client Name: Marsha Charles  Date: October 30, 2023  Service Type:  Group Therapy  Session Start Time:  7:30 PM  Session End Time: 8:30 PM  Session Length: 60 minutes  Attendees: Patient and other group members  Facilitator: СВЕТЛАНА Dutton     Topic:   Guided journaling with focus on setting goals and care planning    Intervention:    Group process: support, challenge, affirm, psycho-education.     Response:  Patient did not participate in group. Pt declined to participate.       СВЕТЛАНА Dutton

## 2023-10-31 NOTE — PROGRESS NOTES
Writer called transport. Transport confirmed that they do not have staff for the ride to [place] until 0745 tomorrow morning. Writer informed pt. Pt visibly disappointed and remained calm and quiet.

## 2023-10-31 NOTE — PROGRESS NOTES
Pt discharged with EMS to [place]. Report given, pt belongings returned, EMTALA and PCS signed and sent along with pt's inhalers. Pt agreed with transport and is cooperative.

## 2023-10-31 NOTE — PROGRESS NOTES
10/31/23 1413   Patient Belongings   Did you bring any home meds/supplements to the hospital?  Yes   Disposition of meds  Sent to security/pharmacy per site process   Patient Belongings locker;remains with patient  (Glasses)   Patient Belongings Put in Hospital Secure Location (Security or Locker, etc.) briefcase;clothing;medication(s);shoes   Belongings Search Yes   Clothing Search Yes   Second Staff Sejal HUERTA   Comment 3 puzzle books, 6 pads, 6 pkg of gum, hygeine products from other hospital, 2x shoes, 4x sweatshirts, 3x long sleeves, 1 bra, 6 pairs of socks, 12 underwears, 6 shirts, 7 pants, 1 tank top, 12 underwhere,6 pairs of socks

## 2023-10-31 NOTE — MEDICATION SCRIBE - ADMISSION MEDICATION HISTORY
Medication Scribe Admission Medication History    Admission medication history is complete. The information provided in this note is only as accurate as the sources available at the time of the update.    Information Source(s): Patient and CareEverywhere/SureScripts via in-person    Pertinent Information:   Patient manages her own medications and is a good historian.   Flexeril- pt reports she was having a hard time getting this rx filled and was unable to get it picked up from the pharmacy. Per her pharmacy it was picked up 10/24/23.  Medication changes made in the ER:  Sertraline 50 mg and abilify 5 mg started  Chantix restarted 0.5 mg daily x 3 days, followed by 0.5 mg BID x 4 days followed by 1 mg BID thereafter. Pt on second day of 0.5 mg BID.   Pain management: pt manages pain with daily dose of apap 1500 mg, gabapentin TID and ibu 600 mg TID PRN.   BG monitoring- pt reports she tests when she remembers and has an old meter.     Changes made to PTA medication list:  Added: zoloft, ibu, flexeril, abilify, apap, BG meter  Deleted: lexapro, risperdal, tessalon  Changed: duonebs to scheduled, lipitor to AM, metformin, gabapentin, symbicort, albuterol updated.     Medication Affordability: no concern    Allergies reviewed with patient and updates made in EHR: yes    Medication History Completed By: Laureen Fuentes 10/31/2023 2:30 PM    PTA Med List   Medication Sig Note Last Dose    acetaminophen (TYLENOL) 500 MG tablet Take 1,500 mg by mouth daily and 1500 mg once daily as needed for migraine prevention.  Past Week    albuterol (PROAIR HFA/PROVENTIL HFA/VENTOLIN HFA) 108 (90 BASE) MCG/ACT Inhaler Inhale 2 puffs into the lungs every 4 hours as needed for wheezing (1st choice)  10/31/2023 at 0441 ER    ARIPiprazole (ABILIFY) 5 MG tablet Take 5 mg by mouth daily 10/31/2023: Started while in the ER *New* 10/31/2023 at 0745 ER    atorvastatin (LIPITOR) 40 MG tablet Take 40 mg by mouth every morning  10/30/2023 at 1934 ER     blood glucose monitoring (NO BRAND SPECIFIED) meter device kit Use to test blood sugar as directed. Pt tests once monthly when she remembers. Unknown brand.  Past Month    budesonide-formoterol (SYMBICORT) 160-4.5 MCG/ACT Inhaler Inhale 2 puffs into the lungs two times daily  10/31/2023 at 0745 [facility] ER    cyclobenzaprine (FLEXERIL) 10 MG tablet Take 10 mg by mouth 3 times daily as needed for muscle spasms      gabapentin (NEURONTIN) 600 MG tablet Take 600 mg by mouth 3 times daily  10/31/2023 at 0746 ER    ipratropium - albuterol 0.5 mg/2.5 mg/3 mL (DUONEB) 0.5-2.5 (3) MG/3ML neb solution Take 1 vial by nebulization 3 times daily  Past Week at scheduled    metFORMIN (GLUCOPHAGE) 500 MG tablet Take 1,000 mg by mouth 2 times daily (Morning and lunchtime)  10/31/2023 at 0745 ER    sertraline (ZOLOFT) 50 MG tablet Take 50 mg by mouth daily 10/31/2023: Started while in the ER *New* 10/31/2023 at 0747 ER    SUMAtriptan (IMITREX) 100 MG tablet Take 100 mg by mouth at onset of headache for migraine -may repeat once in 2 hours. Max 200 mg in 24 hours.  Past Month    varenicline (CHANTIX) 1 MG tablet Take 0.5 mg by mouth twice daily for 4 days, then increase to 1 mg twice daily thereafter. 10/31/2023: On second day of 0.5 mg BID 10/31/2023 at 0746 0.5 mg ER dose

## 2023-11-01 LAB
GLUCOSE BLDC GLUCOMTR-MCNC: 112 MG/DL (ref 70–99)
GLUCOSE BLDC GLUCOMTR-MCNC: 119 MG/DL (ref 70–99)
GLUCOSE BLDC GLUCOMTR-MCNC: 127 MG/DL (ref 70–99)
GLUCOSE BLDC GLUCOMTR-MCNC: 136 MG/DL (ref 70–99)

## 2023-11-01 PROCEDURE — 124N000001 HC R&B MH

## 2023-11-01 PROCEDURE — 250N000013 HC RX MED GY IP 250 OP 250 PS 637: Performed by: NURSE PRACTITIONER

## 2023-11-01 PROCEDURE — 250N000009 HC RX 250: Performed by: NURSE PRACTITIONER

## 2023-11-01 PROCEDURE — 99233 SBSQ HOSP IP/OBS HIGH 50: CPT | Mod: 95 | Performed by: STUDENT IN AN ORGANIZED HEALTH CARE EDUCATION/TRAINING PROGRAM

## 2023-11-01 PROCEDURE — 94640 AIRWAY INHALATION TREATMENT: CPT | Mod: 76

## 2023-11-01 PROCEDURE — 250N000013 HC RX MED GY IP 250 OP 250 PS 637: Performed by: STUDENT IN AN ORGANIZED HEALTH CARE EDUCATION/TRAINING PROGRAM

## 2023-11-01 RX ORDER — ARIPIPRAZOLE 10 MG/1
10 TABLET ORAL DAILY
Status: DISCONTINUED | OUTPATIENT
Start: 2023-11-02 | End: 2023-11-03 | Stop reason: HOSPADM

## 2023-11-01 RX ORDER — ARIPIPRAZOLE 5 MG/1
5 TABLET ORAL ONCE
Status: COMPLETED | OUTPATIENT
Start: 2023-11-01 | End: 2023-11-01

## 2023-11-01 RX ADMIN — ARIPIPRAZOLE 5 MG: 5 TABLET ORAL at 08:03

## 2023-11-01 RX ADMIN — NICOTINE 1 PATCH: 21 PATCH, EXTENDED RELEASE TRANSDERMAL at 08:15

## 2023-11-01 RX ADMIN — GABAPENTIN 600 MG: 600 TABLET, FILM COATED ORAL at 20:24

## 2023-11-01 RX ADMIN — ACETAMINOPHEN 650 MG: 325 TABLET, FILM COATED ORAL at 16:57

## 2023-11-01 RX ADMIN — NICOTINE POLACRILEX 2 MG: 2 GUM, CHEWING ORAL at 18:30

## 2023-11-01 RX ADMIN — Medication 5 MG: at 21:40

## 2023-11-01 RX ADMIN — IPRATROPIUM BROMIDE AND ALBUTEROL SULFATE 3 ML: .5; 3 SOLUTION RESPIRATORY (INHALATION) at 19:27

## 2023-11-01 RX ADMIN — IPRATROPIUM BROMIDE AND ALBUTEROL SULFATE 3 ML: .5; 3 SOLUTION RESPIRATORY (INHALATION) at 13:44

## 2023-11-01 RX ADMIN — FLUTICASONE FUROATE AND VILANTEROL TRIFENATATE 1 PUFF: 200; 25 POWDER RESPIRATORY (INHALATION) at 08:04

## 2023-11-01 RX ADMIN — ALBUTEROL SULFATE 2 PUFF: 90 AEROSOL, METERED RESPIRATORY (INHALATION) at 08:14

## 2023-11-01 RX ADMIN — SERTRALINE HYDROCHLORIDE 100 MG: 100 TABLET ORAL at 08:03

## 2023-11-01 RX ADMIN — METFORMIN HYDROCHLORIDE 1000 MG: 500 TABLET, FILM COATED ORAL at 08:03

## 2023-11-01 RX ADMIN — ATORVASTATIN CALCIUM 40 MG: 40 TABLET, FILM COATED ORAL at 08:03

## 2023-11-01 RX ADMIN — METFORMIN HYDROCHLORIDE 1000 MG: 500 TABLET, FILM COATED ORAL at 16:57

## 2023-11-01 RX ADMIN — IBUPROFEN 600 MG: 600 TABLET, FILM COATED ORAL at 20:24

## 2023-11-01 RX ADMIN — OLANZAPINE 10 MG: 10 TABLET, FILM COATED ORAL at 13:10

## 2023-11-01 RX ADMIN — ARIPIPRAZOLE 5 MG: 5 TABLET ORAL at 11:32

## 2023-11-01 RX ADMIN — GABAPENTIN 600 MG: 600 TABLET, FILM COATED ORAL at 08:03

## 2023-11-01 RX ADMIN — GABAPENTIN 600 MG: 600 TABLET, FILM COATED ORAL at 13:10

## 2023-11-01 ASSESSMENT — ACTIVITIES OF DAILY LIVING (ADL)
ADLS_ACUITY_SCORE: 30
DRESS: SCRUBS (BEHAVIORAL HEALTH)
ADLS_ACUITY_SCORE: 30
HYGIENE/GROOMING: INDEPENDENT
ADLS_ACUITY_SCORE: 30

## 2023-11-01 NOTE — PLAN OF CARE
Problem: Adult Behavioral Health Plan of Care  Goal: Patient-Specific Goal (Individualization)  Description: Patient will sleep 6 to 8 hours per night  Patient will eat at least 50% of meals  Patient will attend at least 50% of groups  Patient will comply with recommendations of treatment team  Patient will remain medication compliant  Patient will report decrease in paranoia and delusions  Patient will be free from self harm or injury  Outcome: Progressing     Problem: Suicide Risk  Goal: Absence of Self-Harm  Outcome: Progressing     Problem: Thought Process Alteration  Goal: Optimal Thought Clarity  Outcome: Progressing     Problem: Fall Injury Risk  Goal: Absence of Fall and Fall-Related Injury  Description: Pt will wear nonskid footwear on unit.   Pt will inform staff of any feeling of unsteadiness.  Outcome: Progressing     Face to face shift report received from Gretel Mei completed, pt observed laying in bed in MHICU, appeared to be sleeping, non-labored even respirations noted.    Patient appeared to be sleeping for approximately 6.75 hours since 2200 last shift.    Patient had no reported or observed suicidal behavior or self harm this shift.      Patient had no reported or observed falls.    Patient morning blood sugar 127.    Face to face report will be communicated to oncoming RN.    Gretel Lee RN  11/1/2023  6:45 AM

## 2023-11-01 NOTE — PROGRESS NOTES
Chart reviewed. Vitals stable. Blood sugars stable. A1c 7.0- which has improved from 7.3 in August. Continue with current regimen. Met with pt nurse and no medical complaints noted.

## 2023-11-01 NOTE — PLAN OF CARE
Face to face shift report received from Gretel MCMANUS RN.       Problem: Adult Behavioral Health Plan of Care  Goal: Patient-Specific Goal (Individualization)  Description: Patient will sleep 6 to 8 hours per night  Patient will eat at least 50% of meals  Patient will attend at least 50% of groups  Patient will comply with recommendations of treatment team  Patient will remain medication compliant  Patient will report decrease in paranoia and delusions  Patient will be free from self harm or injury    10/31/23: Room in MHICU due to paranoia and possibility of unpredictable behaviors. No wean at this time. Treatment team to reassess daily.   Outcome: Not Progressing  Room remains in MHICU due to continued paranoia and possibility of unpredictable behaviors. No wean at this time. Irritable and short in conversation. Argumentative. Continued paranoia, pt reports that she still believes she is being tracked by federal authorities.   1310 Zyprexa 10mg PO for psychosis.   Noon accucheck 112. No Novolog insulin per sliding scale.     Problem: Thought Process Alteration  Goal: Optimal Thought Clarity  Outcome: Not Progressing      Problem: Suicide Risk  Goal: Absence of Self-Harm  Outcome: Progressing    Free from self harm or injury this shift.     Problem: Fall Injury Risk  Goal: Absence of Fall and Fall-Related Injury  Description: Pt will wear nonskid footwear on unit.   Pt will inform staff of any feeling of unsteadiness.  Outcome: Progressing   Free from falls or injury this shift.       Face to face end of shift report to be communicated to oncoming RN.

## 2023-11-01 NOTE — PROGRESS NOTES
"[facility] PSYCHIATRY  PROGRESS NOTE     SUBJECTIVE     Prior to interviewing the patient, I met with nursing and reviewed patient's clinical condition. We discussed clinical care both before and after the interview. I have reviewed the patient's clinical course by review of records including previous notes, labs, and vital signs.     Per nursing, the patient had the following behavioral events over the last 24-hours: Moved to MHICU given level of paranoia and agitation. Received     On psychiatric interview, the patient believes that the government is after her but she does not feel unsafe. She notes that she is not sure why the government is after her. She is not sure what agency. She notes that she came to this conclusion due to constantly being followed. She assumes that they followed her here but has no idea. She notes that maybe they are doing it to \"make her crazy.\"    She notes that she is here and \"ok, ok, nothing I can do.\" She notes that she is worried that our internet connection might not be safe and secure. Discussed how iPad is HIPAA compliant and secure.    The patient denies any confusion, change in vision, chest pain, SOB, abdominal pain, diarrhea, or constipation. No medical concerns today apart from headaches. She notes chronic headaches. No change in severity or intensity.    The patient consents to increasing Abilify after discussing B/R/SE, including sedation, appetite changes, weight gain, metabolic syndrome, akathisia, dystonia, and movement disorders such as tardive dyskinesia.        MEDICATIONS   Scheduled Meds:    ARIPiprazole  5 mg Oral Daily     atorvastatin  40 mg Oral Daily     fluticasone-vilanterol  1 puff Inhalation Daily     gabapentin  600 mg Oral TID     insulin aspart  1-7 Units Subcutaneous TID AC     insulin aspart  1-5 Units Subcutaneous At Bedtime     ipratropium - albuterol 0.5 mg/2.5 mg/3 mL  1 vial Nebulization TID     metFORMIN  1,000 mg Oral BID w/meals     " "nicotine  1 patch Transdermal Daily     nicotine   Transdermal Q8H     sertraline  100 mg Oral Daily     PRN Meds:.acetaminophen, albuterol, alum & mag hydroxide-simethicone, glucose **OR** dextrose **OR** glucagon, hydrOXYzine, ibuprofen, melatonin, OLANZapine **OR** OLANZapine, SUMAtriptan     ALLERGIES   No Known Allergies     MENTAL STATUS EXAM   Vitals: /76   Pulse 93   Temp 97.9  F (36.6  C) (Tympanic)   Resp 16   Ht 1.626 m (5' 4\")   Wt 82.6 kg (182 lb)   SpO2 92%   BMI 31.24 kg/m      Appearance: Alert, oriented, dressed in hospital scrubs  Attitude: Guarded   Eye Contact: Intense at times  Mood: \"Fine\"  Affect: Restricted range of affect, mood congruent  Speech: Normal range. Normal rhythm   Psychomotor Behavior: No tremor, rigidity, akathisia, or psychomotor retardation    Thought Process: Logical, goal directed   Associations: No loose associations   Thought Content: Recent SI. No intent here. No SIB. Denies AVH. Paranoia present regarding government following her  Insight: Limited  Judgment: Limited  Oriented to: Person, place, and time  Attention Span and Concentration: Intact  Recent and Remote Memory: Intact  Language: English with appropriate syntax and vocabulary  Fund of Knowledge: Average  Muscle Strength and Tone: Grossly normal  Gait and Station: Grossly normal       LABS   Recent Results (from the past 24 hour(s))   Hemoglobin A1c    Collection Time: 10/31/23  2:24 PM   Result Value Ref Range    Estimated Average Glucose 154 mg/dL    Hemoglobin A1C 7.0 (H) <5.7 %   Extra Red Top Tube    Collection Time: 10/31/23  2:24 PM   Result Value Ref Range    Hold Specimen JIC    Extra Green Top (Lithium Heparin) Tube    Collection Time: 10/31/23  2:24 PM   Result Value Ref Range    Hold Specimen JIC    Glucose by meter    Collection Time: 10/31/23  2:39 PM   Result Value Ref Range    GLUCOSE BY METER POCT 135 (H) 70 - 99 mg/dL   Glucose by meter    Collection Time: 10/31/23  4:26 PM   Result " Value Ref Range    GLUCOSE BY METER POCT 222 (H) 70 - 99 mg/dL   Glucose by meter    Collection Time: 10/31/23  7:49 PM   Result Value Ref Range    GLUCOSE BY METER POCT 84 70 - 99 mg/dL   Glucose by meter    Collection Time: 11/01/23  6:21 AM   Result Value Ref Range    GLUCOSE BY METER POCT 127 (H) 70 - 99 mg/dL         IMPRESSION     This is a 53 year old female with a PMH of MDD with psychotic features who presents in a depressive episode with SI and psychosis, namely paranoia occurring in the setting of relationship stressors. The patient started having depressive episodes with psychosis back in 2010 with her last hospitalization in 2020 for a similar issue. She has stabilized on an antidepressant and neuroleptic. She has gone periods without medication continuing to be euthymic. She does have a notable family history for bipolar disorder and schizophrenia. She would benefit from inpatient admission to address her depression, SI, and psychosis.     In terms of treatment, will continue previous medications started in the [facility] unit (Abilify, Zoloft, and Gabapentin). Will increase Zoloft to 100 mg tomorrow given severity of her episode.      Will place on 72 hour hold given patient signed 12 hour intent and she there is concern for danger to self in her current episode. Will determine if petition for commitment is necessary.        DIAGNOSES     1. Unspecified mood disorder (MDD with psychosis vs SZAD, depressive type)  2. Tobacco use disorder, moderate       PLAN     Location: Unit 5  Legal Status: Orders Placed This Encounter      Emergency Hospitalization Hold (72 Hr Hold)    Safety Assessment:    Behavioral Orders   Procedures     Code 1 - Restrict to Unit     Routine Programming     As clinically indicated     Status 15     Every 15 minutes.         PTA psychotropic medications stopped:      - Chantix as can sometimes cause mood/thought changes     PTA psychotropic medications continued/changed:      -  Abilify 5 mg daily as started in ED -> 10 mg on 11/1  - Gabapentin 600 mg TID as started in ED  - Zoloft 50 mg started in ED being increased to 100 mg      New psychotropic medications initiated:      - Standard unit prn agents, including Zyprexa prn agitation     Today's Changes:    - Increase Abilify to 10 mg     Programming: Patient will be treated in a therapeutic milieu with appropriate individual and group therapies. Education will be provided on diagnoses, medications, and treatments.     Medical diagnoses:  Per medicine    Anticipated LOS: 3-5 days   Disposition: Home with outpatient services (medication management)       TREATMENT TEAM CARE PLAN     Progress: Continued symptoms.    Continued Stay Criteria/Rationale: Continued symptoms without sufficient improvement/resolution.    Medical/Physical: See above.    Precautions: See above.     Plan: Continue inpatient care with unit support and medication management.    Rationale for change in precautions or plan: NA due to no change.    Participants: Nithin Juan, DO, Nursing, SW, OT.    The patient's care was discussed with the treatment team and chart notes were reviewed.       ATTESTATION      Dr. Nithin Juan  Psychiatrist    Video Visit: Patient has given verbal consent for video visit?: Yes  Type of Service: video visit for mental health treatment  Reason [place] Visit: [place]  Originating Site (patient location): [facility]  Distant Site (provider location): Remote Location  Mode of Communication: Video Conference via [employer]  Time of Service: Date: 11/01/2023 Start: 900 end: 930

## 2023-11-02 LAB
GLUCOSE BLDC GLUCOMTR-MCNC: 114 MG/DL (ref 70–99)
GLUCOSE BLDC GLUCOMTR-MCNC: 116 MG/DL (ref 70–99)
GLUCOSE BLDC GLUCOMTR-MCNC: 133 MG/DL (ref 70–99)
GLUCOSE BLDC GLUCOMTR-MCNC: 156 MG/DL (ref 70–99)

## 2023-11-02 PROCEDURE — 94640 AIRWAY INHALATION TREATMENT: CPT

## 2023-11-02 PROCEDURE — 124N000001 HC R&B MH

## 2023-11-02 PROCEDURE — 99239 HOSP IP/OBS DSCHRG MGMT >30: CPT | Mod: 95 | Performed by: STUDENT IN AN ORGANIZED HEALTH CARE EDUCATION/TRAINING PROGRAM

## 2023-11-02 PROCEDURE — 999N000157 HC STATISTIC RCP TIME EA 10 MIN

## 2023-11-02 PROCEDURE — 250N000009 HC RX 250: Performed by: NURSE PRACTITIONER

## 2023-11-02 PROCEDURE — 94640 AIRWAY INHALATION TREATMENT: CPT | Mod: 76

## 2023-11-02 PROCEDURE — 250N000013 HC RX MED GY IP 250 OP 250 PS 637: Performed by: STUDENT IN AN ORGANIZED HEALTH CARE EDUCATION/TRAINING PROGRAM

## 2023-11-02 PROCEDURE — 250N000013 HC RX MED GY IP 250 OP 250 PS 637: Performed by: NURSE PRACTITIONER

## 2023-11-02 RX ORDER — ARIPIPRAZOLE 10 MG/1
10 TABLET ORAL DAILY
Qty: 60 TABLET | Refills: 0 | Status: SHIPPED | OUTPATIENT
Start: 2023-11-03

## 2023-11-02 RX ORDER — SUMATRIPTAN 100 MG/1
100 TABLET, FILM COATED ORAL
Qty: 60 TABLET | Refills: 0 | Status: SHIPPED | OUTPATIENT
Start: 2023-11-02

## 2023-11-02 RX ORDER — ALBUTEROL SULFATE 90 UG/1
2 AEROSOL, METERED RESPIRATORY (INHALATION) EVERY 4 HOURS PRN
Qty: 18 G | Refills: 0 | Status: SHIPPED | OUTPATIENT
Start: 2023-11-02

## 2023-11-02 RX ORDER — ATORVASTATIN CALCIUM 40 MG/1
40 TABLET, FILM COATED ORAL EVERY MORNING
Qty: 60 TABLET | Refills: 0 | Status: SHIPPED | OUTPATIENT
Start: 2023-11-02

## 2023-11-02 RX ORDER — SERTRALINE HYDROCHLORIDE 100 MG/1
100 TABLET, FILM COATED ORAL DAILY
Qty: 60 TABLET | Refills: 0 | Status: SHIPPED | OUTPATIENT
Start: 2023-11-03

## 2023-11-02 RX ORDER — NICOTINE 21 MG/24HR
1 PATCH, TRANSDERMAL 24 HOURS TRANSDERMAL DAILY
Qty: 60 PATCH | Refills: 0 | Status: SHIPPED | OUTPATIENT
Start: 2023-11-03

## 2023-11-02 RX ORDER — IPRATROPIUM BROMIDE AND ALBUTEROL SULFATE 2.5; .5 MG/3ML; MG/3ML
1 SOLUTION RESPIRATORY (INHALATION) 3 TIMES DAILY
Qty: 540 ML | Refills: 0 | Status: SHIPPED | OUTPATIENT
Start: 2023-11-02

## 2023-11-02 RX ORDER — GABAPENTIN 600 MG/1
600 TABLET ORAL 3 TIMES DAILY
Qty: 180 TABLET | Refills: 0 | Status: SHIPPED | OUTPATIENT
Start: 2023-11-02

## 2023-11-02 RX ORDER — BUDESONIDE AND FORMOTEROL FUMARATE DIHYDRATE 160; 4.5 UG/1; UG/1
2 AEROSOL RESPIRATORY (INHALATION)
Qty: 10.2 G | Refills: 0 | Status: SHIPPED | OUTPATIENT
Start: 2023-11-02

## 2023-11-02 RX ADMIN — GABAPENTIN 600 MG: 600 TABLET, FILM COATED ORAL at 08:04

## 2023-11-02 RX ADMIN — GABAPENTIN 600 MG: 600 TABLET, FILM COATED ORAL at 20:17

## 2023-11-02 RX ADMIN — METFORMIN HYDROCHLORIDE 1000 MG: 500 TABLET, FILM COATED ORAL at 08:03

## 2023-11-02 RX ADMIN — ARIPIPRAZOLE 10 MG: 10 TABLET ORAL at 08:04

## 2023-11-02 RX ADMIN — GABAPENTIN 600 MG: 600 TABLET, FILM COATED ORAL at 14:30

## 2023-11-02 RX ADMIN — Medication 5 MG: at 20:17

## 2023-11-02 RX ADMIN — NICOTINE POLACRILEX 2 MG: 2 GUM, CHEWING ORAL at 14:34

## 2023-11-02 RX ADMIN — NICOTINE POLACRILEX 2 MG: 2 GUM, CHEWING ORAL at 08:06

## 2023-11-02 RX ADMIN — IPRATROPIUM BROMIDE AND ALBUTEROL SULFATE 3 ML: .5; 3 SOLUTION RESPIRATORY (INHALATION) at 07:33

## 2023-11-02 RX ADMIN — NICOTINE 1 PATCH: 21 PATCH, EXTENDED RELEASE TRANSDERMAL at 09:12

## 2023-11-02 RX ADMIN — METFORMIN HYDROCHLORIDE 1000 MG: 500 TABLET, FILM COATED ORAL at 17:14

## 2023-11-02 RX ADMIN — ACETAMINOPHEN 650 MG: 325 TABLET, FILM COATED ORAL at 20:20

## 2023-11-02 RX ADMIN — FLUTICASONE FUROATE AND VILANTEROL TRIFENATATE 1 PUFF: 200; 25 POWDER RESPIRATORY (INHALATION) at 08:06

## 2023-11-02 RX ADMIN — IBUPROFEN 600 MG: 600 TABLET, FILM COATED ORAL at 16:12

## 2023-11-02 RX ADMIN — SERTRALINE HYDROCHLORIDE 100 MG: 100 TABLET ORAL at 08:04

## 2023-11-02 RX ADMIN — OLANZAPINE 10 MG: 10 TABLET, FILM COATED ORAL at 18:32

## 2023-11-02 RX ADMIN — IPRATROPIUM BROMIDE AND ALBUTEROL SULFATE 3 ML: .5; 3 SOLUTION RESPIRATORY (INHALATION) at 13:30

## 2023-11-02 RX ADMIN — IBUPROFEN 600 MG: 600 TABLET, FILM COATED ORAL at 08:06

## 2023-11-02 RX ADMIN — ATORVASTATIN CALCIUM 40 MG: 40 TABLET, FILM COATED ORAL at 08:04

## 2023-11-02 ASSESSMENT — ACTIVITIES OF DAILY LIVING (ADL)
ORAL_HYGIENE: INDEPENDENT
ADLS_ACUITY_SCORE: 30
DRESS: SCRUBS (BEHAVIORAL HEALTH)
ADLS_ACUITY_SCORE: 30
ADLS_ACUITY_SCORE: 30
HYGIENE/GROOMING: INDEPENDENT
ADLS_ACUITY_SCORE: 30
HYGIENE/GROOMING: INDEPENDENT
ADLS_ACUITY_SCORE: 30
LAUNDRY: UNABLE TO COMPLETE
ADLS_ACUITY_SCORE: 30
ADLS_ACUITY_SCORE: 30

## 2023-11-02 NOTE — PLAN OF CARE
"  Problem: Adult Behavioral Health Plan of Care  Goal: Patient-Specific Goal (Individualization)  Description: Patient will sleep 6 to 8 hours per night  Patient will eat at least 50% of meals  Patient will attend at least 50% of groups  Patient will comply with recommendations of treatment team  Patient will remain medication compliant  Patient will report decrease in paranoia and delusions  Patient will be free from self harm or injury    10/31/23: Room in MHICU due to paranoia and possibility of unpredictable behaviors. No wean at this time. Treatment team to reassess daily.     Outcome: Progressing    Sleeping at start of shift, woken for BG before dinner. Eye contact is intermittent. Mood is calmer than previous day. Pt is more guarded in speech and difficult to engage in conversation today.   Spends evening working on [employer] and watches tv briefly. Mainly isolative in room.   Pt uses phone twice this shift to call family.   Pt states she has a headache on r-upper lateral head since taking \"that med they gave me earlier.\" Pt unable to verify what med.   Tylenol 650 mg and Ibuprofen 600 mg rec'd this shift.   Continues to be paranoid--Pt is polite to staff, though watches staff carefully and sometimes asks why they are \"back here\" ( icu).  When asked if she feels government is watching and following her. Pt states \"I'm not talking about that.\"    Gretel Beck RN  11/1/2023  10:32 PM           Problem: Suicide Risk  Goal: Absence of Self-Harm  Intervention: Promote Psychosocial Wellbeing  Recent Flowsheet Documentation  Taken 11/1/2023 1650 by Gretel Beck, RN  Supportive Measures: active listening utilized     Problem: Thought Process Alteration  Goal: Optimal Thought Clarity  Outcome: Progressing     Problem: Fall Injury Risk  Goal: Absence of Fall and Fall-Related Injury  Description: Pt will wear nonskid footwear on unit.   Pt will inform staff of any feeling of unsteadiness.  Outcome: " Progressing   Goal Outcome Evaluation:    Plan of Care Reviewed With: patient

## 2023-11-02 NOTE — PROGRESS NOTES
[facility] PSYCHIATRY  PROGRESS NOTE     SUBJECTIVE     Prior to interviewing the patient, I met with nursing and reviewed patient's clinical condition. We discussed clinical care both before and after the interview. I have reviewed the patient's clinical course by review of records including previous notes, labs, and vital signs.     Per nursing, the patient had the following behavioral events over the last 24-hours: Moved to MHICU given level of paranoia and agitation. Received prn Zyprexa yesterday. Calmer today.    On psychiatric interview, the patient notes that she is doing alright. She notes that she is settling into being here. She notes that she feels pretty good today. She notes that she slept more yesterday afternoon. She notes that her mood feels better. She feels more optimistic.     She notes that her concern for the government feels less. She notes that her environment is different, which can contribute.    The patient notes that she had some trouble sleeping last night after napping during the day. Discussed how this could have contributed.    She denies any safety concerns.     She notes that she is interested in partial hospitalization. Discussed various programs in her area. Discussed where she might want to live.    She denies any problems with her medications. She denies any EPSE.        MEDICATIONS   Scheduled Meds:    ARIPiprazole  10 mg Oral Daily     atorvastatin  40 mg Oral Daily     fluticasone-vilanterol  1 puff Inhalation Daily     gabapentin  600 mg Oral TID     insulin aspart  1-7 Units Subcutaneous TID AC     insulin aspart  1-5 Units Subcutaneous At Bedtime     ipratropium - albuterol 0.5 mg/2.5 mg/3 mL  1 vial Nebulization TID     metFORMIN  1,000 mg Oral BID w/meals     nicotine  1 patch Transdermal Daily     nicotine   Transdermal Q8H     sertraline  100 mg Oral Daily     PRN Meds:.acetaminophen, albuterol, alum & mag hydroxide-simethicone, glucose **OR** dextrose **OR**  "glucagon, hydrOXYzine, ibuprofen, melatonin, nicotine, OLANZapine **OR** OLANZapine, SUMAtriptan     ALLERGIES   No Known Allergies     MENTAL STATUS EXAM   Vitals: /78   Pulse 92   Temp 98.3  F (36.8  C) (Tympanic)   Resp 16   Ht 1.626 m (5' 4\")   Wt 82.6 kg (182 lb)   SpO2 93%   BMI 31.24 kg/m      Appearance: Alert, oriented, dressed in hospital scrubs  Attitude: More cooperative  Eye Contact: Fair  Mood: \"Better\"  Affect: Restricted range of affect, mood congruent  Speech: Normal range. Normal rhythm   Psychomotor Behavior: No tremor, rigidity, akathisia, or psychomotor retardation    Thought Process: Logical, goal directed   Associations: No loose associations   Thought Content: Denies SI. No SIB. Denies AVH. Paranoia present regarding government following her, softening.  Insight: Limited  Judgment: Limited  Oriented to: Person, place, and time  Attention Span and Concentration: Intact  Recent and Remote Memory: Intact  Language: English with appropriate syntax and vocabulary  Fund of Knowledge: Average  Muscle Strength and Tone: Grossly normal  Gait and Station: Grossly normal       LABS   Recent Results (from the past 24 hour(s))   Glucose by meter    Collection Time: 11/01/23 11:25 AM   Result Value Ref Range    GLUCOSE BY METER POCT 112 (H) 70 - 99 mg/dL   Glucose by meter    Collection Time: 11/01/23  4:46 PM   Result Value Ref Range    GLUCOSE BY METER POCT 119 (H) 70 - 99 mg/dL   Glucose by meter    Collection Time: 11/01/23  7:50 PM   Result Value Ref Range    GLUCOSE BY METER POCT 136 (H) 70 - 99 mg/dL   Glucose by meter    Collection Time: 11/02/23  7:03 AM   Result Value Ref Range    GLUCOSE BY METER POCT 133 (H) 70 - 99 mg/dL         IMPRESSION     This is a 53 year old female with a PMH of MDD with psychotic features who presents in a depressive episode with SI and psychosis, namely paranoia occurring in the setting of relationship stressors. The patient started having depressive " episodes with psychosis back in 2010 with her last hospitalization in 2020 for a similar issue. She has stabilized on an antidepressant and neuroleptic. She has gone periods without medication continuing to be euthymic. She does have a notable family history for bipolar disorder and schizophrenia. She would benefit from inpatient admission to address her depression, SI, and psychosis.     In terms of treatment, will continue previous medications started in the [facility] unit (Abilify, Zoloft, and Gabapentin). Will increase Zoloft to 100 mg tomorrow given severity of her episode.      Will place on 72 hour hold given patient signed 12 hour intent and she there is concern for danger to self in her current episode. Will determine if petition for commitment is necessary.    Today: The patient's depression is improving as evidence by her subjective experiencing and less irritability. Her psychosis is starting to soften lightly. Safety issues resolved. Recommend continued hospitalization past 72 hour hold with patient likely not interested. Given stabilization, her taking medications, and danger factor improving, not petitioning for commitment. In addition, attempting to arrange PHP programming to assist.        DIAGNOSES     1. Unspecified mood disorder (MDD with psychosis vs SZAD, depressive type)  2. Tobacco use disorder, moderate       PLAN     Location: Unit 5  Legal Status: Orders Placed This Encounter      Emergency Hospitalization Hold (72 Hr Hold)    Safety Assessment:    Behavioral Orders   Procedures     Code 1 - Restrict to Unit     Routine Programming     As clinically indicated     Status 15     Every 15 minutes.         PTA psychotropic medications stopped:      - Chantix as can sometimes cause mood/thought changes     PTA psychotropic medications continued/changed:      - Abilify 5 mg daily as started in ED -> 10 mg on 11/1  - Gabapentin 600 mg TID as started in ED  - Zoloft 50 mg started in ED being  increased to 100 mg      New psychotropic medications initiated:      - Standard unit prn agents, including Zyprexa prn agitation     Today's Changes:    - None    Programming: Patient will be treated in a therapeutic milieu with appropriate individual and group therapies. Education will be provided on diagnoses, medications, and treatments.     Medical diagnoses:  Per medicine    Anticipated LOS: 3-5 days   Disposition: Home with outpatient services (medication management and PHP)       ATTESTATION      Dr. Nithin Juan  Psychiatrist    Video Visit: Patient has given verbal consent for video visit?: Yes  Type of Service: video visit for mental health treatment  Reason for [place] Visit: [place]  Originating Site (patient location): [facility]  Distant Site (provider location): Remote Location  Mode of Communication: Video Conference via [employer]  Time of Service: Date: 11/02/2023 Start: 815 end: 847

## 2023-11-02 NOTE — PLAN OF CARE
End of shift report received from Tonny CORTES RN.  Rounding completed, pt observed.    Problem: Adult Behavioral Health Plan of Care  Goal: Patient-Specific Goal (Individualization)  Description: Patient will sleep 6 to 8 hours per night  Patient will eat at least 50% of meals  Patient will attend at least 50% of groups  Patient will comply with recommendations of treatment team  Patient will remain medication compliant  Patient will report decrease in paranoia and delusions  Patient will be free from self harm or injury    10/31/23: Room in MHICU due to paranoia and possibility of unpredictable behaviors. No wean at this time. Treatment team to reassess daily.       Outcome: Progressing  Goal: Adheres to Safety Considerations for Self and Others  Outcome: Progressing  Goal: Absence of New-Onset Illness or Injury  Outcome: Progressing  Goal: Optimized Coping Skills in Response to Life Stressors  Outcome: Progressing     Problem: Suicide Risk  Goal: Absence of Self-Harm  Outcome: Progressing     Problem: Thought Process Alteration  Goal: Optimal Thought Clarity  Outcome: Progressing     Problem: Fall Injury Risk  Goal: Absence of Fall and Fall-Related Injury  Description: Pt will wear nonskid footwear on unit.   Pt will inform staff of any feeling of unsteadiness.  Outcome: Progressing   Goal Outcome Evaluation:       Pt remains in the MHICU due to paranoia and unpredictable behaviors. Continues to be a no wean at this time.    0806 - Ibuprofen 600 mg given per request for lower back pain with some relief per patient.     1100 - accucheck 114. No Novolog insulin per sliding scale.     Eye contact is intermittent. Calm and cooperative with staff. Took all scheduled medications. Watched television and worked on clypd this shift. Appetite is good. Gait is steady. No falls.       Face to face end of shift report to be communicated to oncoming RN.

## 2023-11-02 NOTE — PLAN OF CARE
Face to face shift report received from nurse. Rounding completed, pt observed.    Problem: Adult Behavioral Health Plan of Care  Goal: Patient-Specific Goal (Individualization)  Description: Patient will sleep 6 to 8 hours per night  Patient will eat at least 50% of meals  Patient will attend at least 50% of groups  Patient will comply with recommendations of treatment team  Patient will remain medication compliant  Patient will report decrease in paranoia and delusions  Patient will be free from self harm or injury    10/31/23: Room in MHICU due to paranoia and possibility of unpredictable behaviors. No wean at this time. Treatment team to reassess daily.   Outcome: Progressing   Pt has been in bed with eyes closed and regular respirations observed all night. Will continue to monitor. Patient slept 6 hours no issues noted. Patients blood glucose was 133 at 0700.     Face to face report will be communicated to oncoming RN.    Tonny Nguyen RN  11/2/2023

## 2023-11-02 NOTE — DISCHARGE SUMMARY
[facility] PSYCHIATRY  DISCHARGE SUMMARY     DISCHARGE DATA     Marsha Charles MRN# [id number]   Age: 53 year old YOB: 1970     Date of Admission: 10/31/2023  Date of Discharge: 11/3/2023  Discharge Provider: Nithin Juan DO       REASON FOR ADMISSION     This is a 53 year old female with a PMH of MDD with psychotic features who presents in a depressive episode with SI and psychosis, namely paranoia occurring in the setting of relationship stressors. The patient started having depressive episodes with psychosis back in 2010 with her last hospitalization in 2020 for a similar issue. She has stabilized on an antidepressant and neuroleptic. She has gone periods without medication continuing to be euthymic. She does have a notable family history for bipolar disorder and schizophrenia. She would benefit from inpatient admission to address her depression, SI, and psychosis.     In terms of treatment, will continue previous medications started in the [facility] unit (Abilify, Zoloft, and Gabapentin). Will increase Zoloft to 100 mg tomorrow given severity of her episode.      Will place on 72 hour hold given patient signed 12 hour intent and she there is concern for danger to self in her current episode. Will determine if petition for commitment is necessary.       DISCHARGE DIAGNOSES     1. Schizoaffective disorder, depressive type, multiple episodes, in acute episode  2. Tobacco use disorder, moderate       CONSULTS     None       HOSPITAL COURSE   Psychiatric Course:    Legal status: Orders Placed This Encounter      Emergency Hospitalization Hold (72 Hr Hold)    Patient was admitted to unit 5 due to the aforementioned presentation. The patient was placed under 15 minute checks to ensure patient safety. The patient participated in unit programming and groups as able.    Ms. Charles did not require seclusion/restraint during hospitalization.     We reviewed with Ms. Charles current and past medication  trials including duration, dose, response and side effects. During this hospitalization, the following changes to the patient's psychotropic medications were made:    PTA psychotropic medications stopped:      - Chantix as can sometimes cause mood/thought changes     PTA psychotropic medications continued/changed:      - Abilify 5 mg daily as started in ED increased to 10 mg on 11/1  - Gabapentin 600 mg TID as started in ED (using for anxiety and peripheral neuropathy)  - Zoloft 50 mg started in ED being increased to 100 mg on 11/1     New psychotropic medications initiated:      - None    The patient presented in a depressive state with psychosis. The patient's medications as started in the ED were continued with increases per above to address her depression and psychosis. The patient tolerated these increases well without problems apart from some transient headaches. The patient's depression improved with her SI resolving. In addition, her paranoia about the government reduced some, still being present, but less. Decided not to petition for commitment given improvement in symptoms, willingness to take medications, and safety concerns resolving. Recommended the patient stay longer past her hold expiration, but she preferred to leave given some improvement. Also, recommended she take her current medication regimen for the next 6-12 months at least unless any changes are needed.     With these changes and supports the patient noticed improvement in their symptoms and felt sufficiently ready for discharge. As a result, Marsha Charles was discharged. At the time of discharge, Marsha Charles was determined to not be a danger to self or others. At the current time of discharge, the patient does not meet criteria for involuntary hospitalization. On the day of discharge, the patient reports that they do not have suicidal or homicidal ideation. Steps taken to minimize risk include: assessing patient s behavior and thought  process daily during hospital stay, discharging patient with adequate plan for follow up for mental and physical health and discussing safety plan of returning to the hospital should the patient ever have thoughts of harming themselves or others. Therefore, based on all available evidence including the factors cited above, the patient does not appear to be at imminent risk for self-harm, and is appropriate for outpatient level of care. However, if patient uses substances or is medication non-adherent, their risk of decompensation and SI will be elevated. This was discussed with the patient.    Medical Course:    The patient was medically cleared for admission to inpatient psychiatry. No acute medical issues arose. The patient was medically stable at the time of discharge.        DISCHARGE MEDICATIONS     Current Discharge Medication List      START taking these medications    Details   nicotine (NICODERM CQ) 21 MG/24HR 24 hr patch Place 1 patch onto the skin daily  Qty: 60 patch, Refills: 0    Associated Diagnoses: Tobacco use disorder      nicotine (NICORETTE) 2 MG gum Place 1 each (2 mg) inside cheek every hour as needed for smoking cessation  Qty: 240 each, Refills: 0    Associated Diagnoses: Tobacco use disorder         CONTINUE these medications which have CHANGED    Details   albuterol (PROAIR HFA/PROVENTIL HFA/VENTOLIN HFA) 108 (90 Base) MCG/ACT inhaler Inhale 2 puffs into the lungs every 4 hours as needed for wheezing (1st choice)  Qty: 18 g, Refills: 0    Comments: Pharmacy may dispense brand covered by insurance (Proair, or proventil or ventolin or generic albuterol inhaler)  Associated Diagnoses: COPD with chronic bronchitis      ARIPiprazole (ABILIFY) 10 MG tablet Take 1 tablet (10 mg) by mouth daily  Qty: 60 tablet, Refills: 0    Associated Diagnoses: Severe episode of recurrent major depressive disorder, with psychotic features (H)      atorvastatin (LIPITOR) 40 MG tablet Take 1 tablet (40 mg) by mouth  every morning  Qty: 60 tablet, Refills: 0    Associated Diagnoses: Hypercholesterolemia      budesonide-formoterol (SYMBICORT) 160-4.5 MCG/ACT Inhaler Inhale 2 puffs into the lungs two times daily  Qty: 10.2 g, Refills: 0    Associated Diagnoses: COPD with chronic bronchitis      gabapentin (NEURONTIN) 600 MG tablet Take 1 tablet (600 mg) by mouth 3 times daily  Qty: 180 tablet, Refills: 0    Associated Diagnoses: Severe episode of recurrent major depressive disorder, with psychotic features (H)      ipratropium - albuterol 0.5 mg/2.5 mg/3 mL (DUONEB) 0.5-2.5 (3) MG/3ML neb solution Take 1 vial (3 mLs) by nebulization 3 times daily  Qty: 540 mL, Refills: 0    Associated Diagnoses: COPD with chronic bronchitis      metFORMIN (GLUCOPHAGE) 500 MG tablet Take 2 tablets (1,000 mg) by mouth 2 times daily (with meals) (Morning and lunchtime)  Qty: 240 tablet, Refills: 0    Associated Diagnoses: Type 2 diabetes mellitus without complication, without long-term current use of insulin (H)      sertraline (ZOLOFT) 100 MG tablet Take 1 tablet (100 mg) by mouth daily  Qty: 60 tablet, Refills: 0    Associated Diagnoses: Severe episode of recurrent major depressive disorder, with psychotic features (H)      SUMAtriptan (IMITREX) 100 MG tablet Take 1 tablet (100 mg) by mouth at onset of headache for migraine -may repeat once in 2 hours. Max 200 mg in 24 hours.  Qty: 60 tablet, Refills: 0    Associated Diagnoses: Intractable chronic migraine without aura and without status migrainosus         CONTINUE these medications which have NOT CHANGED    Details   acetaminophen (TYLENOL) 500 MG tablet Take 1,500 mg by mouth daily and 1500 mg once daily as needed for migraine prevention.      blood glucose monitoring (NO BRAND SPECIFIED) meter device kit Use to test blood sugar as directed. Pt tests once monthly when she remembers. Unknown brand.      ibuprofen (ADVIL/MOTRIN) 600 MG tablet Take 600 mg by mouth 3 times daily as needed (pain)     "     STOP taking these medications       cyclobenzaprine (FLEXERIL) 10 MG tablet Comments:   Reason for Stopping:         varenicline (CHANTIX) 1 MG tablet Comments:   Reason for Stopping:                MENTAL STATUS EXAM   Vitals: /78   Pulse 92   Temp 98.3  F (36.8  C) (Tympanic)   Resp 16   Ht 1.626 m (5' 4\")   Wt 82.6 kg (182 lb)   SpO2 93%   BMI 31.24 kg/m      Appearance: Alert, oriented, dressed in hospital scrubs, appears stated age   Attitude: More cooperative  Eye Contact: Good  Mood: \"Better\"  Affect: Some restriction  Speech: Normal rate and rhythm   Psychomotor Behavior: No tremor, rigidity, or psychomotor abnormality   Thought Process: Logical, goal directed   Associations: No loose associations   Thought Content: Denies SI or plan. No SIB. Denies A/V hallucinations. Paranoia regarding government less.  Insight: Limited  Judgment: Fair  Oriented to: Person, place, and time  Attention Span and Concentration: Intact  Recent and Remote Memory: Intact  Language: English with appropriate syntax and vocabulary  Fund of Knowledge: Average  Muscle Strength and Tone: Grossly normal  Gait and Station: Did not observe       DISCHARGE PLAN     1.  Education given regarding diagnostic and treatment options with risks, benefits and alternatives with adequate verbalization of understanding.  2.  Discharge to home. Upon detailed review of risk factors, patient amenable for release.   3.  Continue aforementioned medications and associated medication changes with follow-up by outpatient provider.  4.  Crisis management planning in place.    5.  Nursing and  to review further discharge recommendations.   6.  Patient is being discharged with the following appointments:    See AVS    7. General discharge instructions:       Reason for your hospital stay    Depression and psychosis.     Follow-up and recommended labs and tests     Follow-up with your health care provider as documented in the " AVS. Recommended follow-up labs/tests include the following: routine antipsychotic labs.     Activity    Your activity upon discharge: activity as tolerated     Diet    Follow this diet upon discharge: Orders Placed This Encounter      Consistent Carbohydrate Diet Moderate Consistent Carb (60 g CHO per Meal) Diet           DISCHARGE SERVICES PROVIDED     45 minutes spent on discharge services, including:  Final examination of patient.  Review and discussion of hospital stay.  Instructions for continued outpatient care/goals.  Preparation of discharge records.  Preparation of medications refills and new prescriptions.  Preparation of applicable referral forms.        ATTESTATION     Dr. Nithin Juan  Psychiatrist     Video Visit: Patient has given verbal consent for video visit?: Yes  Type of Service: video visit for mental health treatment  Reason for [place] Visit: [place]  Originating Site (patient location): [facility]  Distant Site (provider location): Remote Location  Mode of Communication: Video Conference via [employer]  Time of Service: Date: 11/2/2023 Start: 630 end: 700         LABS THIS ADMISSION     Results for orders placed or performed during the hospital encounter of 10/31/23   Hemoglobin A1c     Status: Abnormal   Result Value Ref Range    Estimated Average Glucose 154 mg/dL    Hemoglobin A1C 7.0 (H) <5.7 %   Extra Tube     Status: None    Narrative    The following orders were created for panel order Extra Tube.  Procedure                               Abnormality         Status                     ---------                               -----------         ------                     Extra Red Top Tube[[id number]]                               Final result               Extra Green Top (Lithium...[[id number]]                      Final result                 Please view results for these tests on the individual orders.   Extra Red Top Tube     Status: None   Result Value Ref  Range    Hold Specimen JIC    Extra Green Top (Lithium Heparin) Tube     Status: None   Result Value Ref Range    Hold Specimen JIC    Glucose by meter     Status: Abnormal   Result Value Ref Range    GLUCOSE BY METER POCT 135 (H) 70 - 99 mg/dL   Glucose by meter     Status: Abnormal   Result Value Ref Range    GLUCOSE BY METER POCT 222 (H) 70 - 99 mg/dL   Glucose by meter     Status: Normal   Result Value Ref Range    GLUCOSE BY METER POCT 84 70 - 99 mg/dL   Glucose by meter     Status: Abnormal   Result Value Ref Range    GLUCOSE BY METER POCT 127 (H) 70 - 99 mg/dL   Glucose by meter     Status: Abnormal   Result Value Ref Range    GLUCOSE BY METER POCT 112 (H) 70 - 99 mg/dL   Glucose by meter     Status: Abnormal   Result Value Ref Range    GLUCOSE BY METER POCT 119 (H) 70 - 99 mg/dL   Glucose by meter     Status: Abnormal   Result Value Ref Range    GLUCOSE BY METER POCT 136 (H) 70 - 99 mg/dL   Glucose by meter     Status: Abnormal   Result Value Ref Range    GLUCOSE BY METER POCT 133 (H) 70 - 99 mg/dL

## 2023-11-02 NOTE — PROGRESS NOTES
1:1 time with the patient.  No changes made to the discharge plan at this time.   See the AVS for follow up appointments and recommendations.      Referral sent to [facility] for psychiatry, therapy, and [facility] day treatment.     Pt will be picked up 11/3/23 by her .

## 2023-11-03 VITALS
TEMPERATURE: 98.2 F | WEIGHT: 182 LBS | HEIGHT: 64 IN | OXYGEN SATURATION: 92 % | SYSTOLIC BLOOD PRESSURE: 119 MMHG | HEART RATE: 84 BPM | RESPIRATION RATE: 16 BRPM | BODY MASS INDEX: 31.07 KG/M2 | DIASTOLIC BLOOD PRESSURE: 69 MMHG

## 2023-11-03 LAB — GLUCOSE BLDC GLUCOMTR-MCNC: 187 MG/DL (ref 70–99)

## 2023-11-03 PROCEDURE — 250N000013 HC RX MED GY IP 250 OP 250 PS 637: Performed by: STUDENT IN AN ORGANIZED HEALTH CARE EDUCATION/TRAINING PROGRAM

## 2023-11-03 PROCEDURE — 999N000157 HC STATISTIC RCP TIME EA 10 MIN

## 2023-11-03 PROCEDURE — 94640 AIRWAY INHALATION TREATMENT: CPT | Mod: 76

## 2023-11-03 PROCEDURE — 250N000009 HC RX 250: Performed by: NURSE PRACTITIONER

## 2023-11-03 PROCEDURE — 250N000013 HC RX MED GY IP 250 OP 250 PS 637: Performed by: NURSE PRACTITIONER

## 2023-11-03 RX ORDER — CYCLOBENZAPRINE HCL 10 MG
10 TABLET ORAL 2 TIMES DAILY PRN
Qty: 45 TABLET | Refills: 1 | Status: SHIPPED | OUTPATIENT
Start: 2023-11-03

## 2023-11-03 RX ADMIN — NICOTINE 1 PATCH: 21 PATCH, EXTENDED RELEASE TRANSDERMAL at 08:07

## 2023-11-03 RX ADMIN — ATORVASTATIN CALCIUM 40 MG: 40 TABLET, FILM COATED ORAL at 08:05

## 2023-11-03 RX ADMIN — METFORMIN HYDROCHLORIDE 1000 MG: 500 TABLET, FILM COATED ORAL at 08:05

## 2023-11-03 RX ADMIN — ARIPIPRAZOLE 10 MG: 10 TABLET ORAL at 08:05

## 2023-11-03 RX ADMIN — IBUPROFEN 600 MG: 600 TABLET, FILM COATED ORAL at 05:27

## 2023-11-03 RX ADMIN — GABAPENTIN 600 MG: 600 TABLET, FILM COATED ORAL at 08:05

## 2023-11-03 RX ADMIN — SERTRALINE HYDROCHLORIDE 100 MG: 100 TABLET ORAL at 08:05

## 2023-11-03 RX ADMIN — IPRATROPIUM BROMIDE AND ALBUTEROL SULFATE 3 ML: .5; 3 SOLUTION RESPIRATORY (INHALATION) at 09:10

## 2023-11-03 RX ADMIN — FLUTICASONE FUROATE AND VILANTEROL TRIFENATATE 1 PUFF: 200; 25 POWDER RESPIRATORY (INHALATION) at 08:06

## 2023-11-03 RX ADMIN — INSULIN ASPART 1 UNITS: 100 INJECTION, SOLUTION INTRAVENOUS; SUBCUTANEOUS at 07:42

## 2023-11-03 ASSESSMENT — ACTIVITIES OF DAILY LIVING (ADL)
HYGIENE/GROOMING: INDEPENDENT
ADLS_ACUITY_SCORE: 30
ORAL_HYGIENE: INDEPENDENT
DRESS: SCRUBS (BEHAVIORAL HEALTH)
ADLS_ACUITY_SCORE: 30
LAUNDRY: UNABLE TO COMPLETE
ADLS_ACUITY_SCORE: 30

## 2023-11-03 NOTE — PLAN OF CARE
End of shift received from Kashif MONTENEGRO RN.  Rounding completed, pt observed.    Problem: Adult Behavioral Health Plan of Care  Goal: Patient-Specific Goal (Individualization)  Description: Patient will sleep 6 to 8 hours per night  Patient will eat at least 50% of meals  Patient will attend at least 50% of groups  Patient will comply with recommendations of treatment team  Patient will remain medication compliant  Patient will report decrease in paranoia and delusions  Patient will be free from self harm or injury    10/31/23: Room in [facility] due to paranoia and possibility of unpredictable behaviors. No wean at this time. Treatment team to reassess daily.       Outcome: Progressing     Problem: Suicide Risk  Goal: Absence of Self-Harm  Outcome: Progressing     Problem: Thought Process Alteration  Goal: Optimal Thought Clarity  Outcome: Progressing     Problem: Fall Injury Risk  Goal: Absence of Fall and Fall-Related Injury  Description: Pt will wear nonskid footwear on unit.   Pt will inform staff of any feeling of unsteadiness.  Outcome: Progressing   Goal Outcome Evaluation:      Discharge Note    Patient Discharged to home on 11/3/2023 9:25 AM via Private Car accompanied by .     Patient informed of discharge instructions in AVS. patient verbalizes understanding and denies having any questions pertaining to AVS. Patient stable at time of discharge. Patient denies SI, HI, and thoughts of self harm at time of discharge. All personal belongings returned to patient. Discharge prescriptions sent to [facility] via electronic communication and sent home with patient Psych evaluation, history and physical, AVS, and discharge summary faxed to next level of care- .     Eileen Donohue RN  11/3/2023  10:46 AM

## 2023-11-03 NOTE — PLAN OF CARE
Face to face end of shift report received from Sherlyn STANFORD. Rounding completed. Patient observed in bed appears asleep.     Problem: Adult Behavioral Health Plan of Care  Goal: Patient-Specific Goal (Individualization)  Description: Patient will sleep 6 to 8 hours per night  Patient will eat at least 50% of meals  Patient will attend at least 50% of groups  Patient will comply with recommendations of treatment team  Patient will remain medication compliant  Patient will report decrease in paranoia and delusions  Patient will be free from self harm or injury    10/31/23: Room in [facility] due to paranoia and possibility of unpredictable behaviors. No wean at this time. Treatment team to reassess daily.       Outcome: Progressing     Problem: Suicide Risk  Goal: Absence of Self-Harm  Outcome: Progressing     Problem: Thought Process Alteration  Goal: Optimal Thought Clarity  Outcome: Progressing     Problem: Fall Injury Risk  Goal: Absence of Fall and Fall-Related Injury  Description: Pt will wear nonskid footwear on unit.   Pt will inform staff of any feeling of unsteadiness.  Outcome: Progressing   Goal Outcome Evaluation:         Patient complaint of hip pain 10/10 given ibuprofen 600mg at 0527.  No observed episodes of SIB this shift Patient slept (6.25) hours.  Face to face end of shift report communicated to oncoming shift.     Kashif Lombardo RN  11/3/2023  0607 AM

## 2023-11-03 NOTE — PLAN OF CARE
Problem: Adult Behavioral Health Plan of Care  Goal: Patient-Specific Goal (Individualization)  Description: Patient will sleep 6 to 8 hours per night  Patient will eat at least 50% of meals  Patient will attend at least 50% of groups  Patient will comply with recommendations of treatment team  Patient will remain medication compliant  Patient will report decrease in paranoia and delusions  Patient will be free from self harm or injury    Outcome: Progressing    Alert, pain to left hip running to back of knee 8/10-Ibuprofen 600 and tylenol rec'd. Pt also uses hot pack.   Speech is less pressured and rambling with linear conversations.  Pt less guarded with staff. Pt told she will discharge home tomorrow. Pt  will pick her up at 10:00.    Pt moved to open unit per previous shift -pt spends time in lounge talking with peers and her room mate. Interactions appropriate.   Lets needs be known.   Denies all criteria including: SI, SIB, HI or hallucinations.       Problem: Suicide Risk  Goal: Absence of Self-Harm  Outcome: Progressing     Problem: Thought Process Alteration  Goal: Optimal Thought Clarity  Outcome: Progressing     Problem: Fall Injury Risk  Goal: Absence of Fall and Fall-Related Injury  Description: Pt will wear nonskid footwear on unit.   Pt will inform staff of any feeling of unsteadiness.  Outcome: Progressing     Problem: Thought Process Alteration  Goal: Optimal Thought Clarity  Outcome: Progressing   Goal Outcome Evaluation:    Plan of Care Reviewed With: patient        Face to face end of shift report communicated to oncoming shift.     Gretel Beck RN  11/2/2023  10:27 PM

## 2023-11-03 NOTE — PROGRESS NOTES
Pt is discharging at the recommendation of the treatment team. Pt is discharging to home transported by . Pt denies having any thoughts of hurting themself or anyone else. Pt denies anxiety or depression. Pt has referrals sent into [facility]. Discharge instructions, including; demographic sheet, psychiatric evaluation, discharge summary, and AVS were faxed to these next level of care providers.

## 2024-04-06 ENCOUNTER — HEALTH MAINTENANCE LETTER (OUTPATIENT)
Age: 54
End: 2024-04-06

## 2024-06-15 ENCOUNTER — HEALTH MAINTENANCE LETTER (OUTPATIENT)
Age: 54
End: 2024-06-15

## 2024-08-24 ENCOUNTER — HEALTH MAINTENANCE LETTER (OUTPATIENT)
Age: 54
End: 2024-08-24

## 2024-10-10 ENCOUNTER — LAB REQUISITION (OUTPATIENT)
Dept: LAB | Facility: CLINIC | Age: 54
End: 2024-10-10

## 2024-10-10 LAB
C PNEUM DNA SPEC QL NAA+PROBE: NOT DETECTED
FLUAV H1 2009 PAND RNA SPEC QL NAA+PROBE: NOT DETECTED
FLUAV H1 RNA SPEC QL NAA+PROBE: NOT DETECTED
FLUAV H3 RNA SPEC QL NAA+PROBE: NOT DETECTED
FLUAV RNA SPEC QL NAA+PROBE: NOT DETECTED
FLUBV RNA SPEC QL NAA+PROBE: NOT DETECTED
HADV DNA SPEC QL NAA+PROBE: NOT DETECTED
HCOV PNL SPEC NAA+PROBE: NOT DETECTED
HMPV RNA SPEC QL NAA+PROBE: NOT DETECTED
HPIV1 RNA SPEC QL NAA+PROBE: NOT DETECTED
HPIV2 RNA SPEC QL NAA+PROBE: NOT DETECTED
HPIV3 RNA SPEC QL NAA+PROBE: NOT DETECTED
HPIV4 RNA SPEC QL NAA+PROBE: NOT DETECTED
M PNEUMO DNA SPEC QL NAA+PROBE: NOT DETECTED
RSV RNA SPEC QL NAA+PROBE: NOT DETECTED
RSV RNA SPEC QL NAA+PROBE: NOT DETECTED
RV+EV RNA SPEC QL NAA+PROBE: NOT DETECTED

## 2024-10-10 PROCEDURE — 87486 CHLMYD PNEUM DNA AMP PROBE: CPT | Performed by: FAMILY MEDICINE

## 2024-12-29 ENCOUNTER — HEALTH MAINTENANCE LETTER (OUTPATIENT)
Age: 54
End: 2024-12-29

## 2025-01-13 NOTE — PROGRESS NOTES
"Triage & Transition Services [facility]     Progress Note    Patient: June goes by \"June,\" uses she/her pronouns  Date of Service: October 29, 2023  Site of Service: [facility]  Patient was seen in-person.     Presenting problem:  Please see initial [facility] Crisis Assessment completed by Yudelka Bills [facility] on 10/26/23 for complete assessment information. Notable concerns include paranoia, delusional thinking, depression.     Individuals Present: Marsha & СВЕТЛАНА Barton    Session start: 8:05AM  Session end: 8:33AM  Session duration in minutes: 28 minutes  [facility] utilized: [id number] - Psychotherapy (with patient) - 30 (16-37*) min    Current Presentation:   Met with patient in consult room B for reassessment. Patient came to this interview willingly. Patient presented as guarded and irritable. Patient reports frustration with the wait time for an inpatient bed and frustration due to lack of nicotine. Patient has been a daily smoker of at times up to 3 packs per day and being unable to smoke at [facility] is very stressful. In the past she has quit smoking and has done crocheting or knitting to keep her hands busy but is also unable to do these activities. Patient reports continued suicidal thoughts and feels she needs an inpatient bed for safety. She denies any current plan for harming herself. Patient continues to feel that people are following her and watching her, but does not know who these people are beny why they're following her. Patient is very irritable when answering questions. She reports feeling \"stuck\" in [facility] and does not like feeling like she cannot leave. Patient was informed that she is here at [facility] voluntarily and we explored additional options for continued treatment, including IOP/PHP and outpatient psychiatry/therapy. Patient perseverates on her insurance coverage, and whether writer and other therapists she is seeing are covered in her network. Patient is frustrated with her increase in appetite and " ate 4 meals yesterday compared to typically eating only 1 meal at home.     Additional Collateral Information:  N/A     Mental Status Exam   Affect: Constricted   Appearance: Appropriate    Attention Span/Concentration: Attentive  Eye Contact: Engaged   Fund of Knowledge: Appropriate    Language /Speech Content: Fluent   Language /Speech Volume: Normal    Language /Speech Rate/Productions: Normal    Recent Memory: Intact   Remote Memory: Intact   Mood: Angry, Irritable, and Sad    Orientation to Person: Yes    Orientation to Place: Yes   Orientation to Time of Day: Yes    Orientation to Date: Yes    Situation (Do they understand why they are here?): Yes    Psychomotor Behavior: Normal    Thought Content: Delusions, Paranoia, and Suicidal   Thought Form: Obsessive/Perseverative and Tangential     Diagnosis:   Diagnosis Code    Mixed incontinence N39.46    Asthma, moderate persistent J45.40    BMI 33.0-33.9,adult Z68.33    Chronic constipation K59.09    COPD (chronic obstructive pulmonary disease) (H) J44.9    COPD with chronic bronchitis 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 Engaged in cognitive restructuring/ reframing, looked at common cognitive distortions and challenged negative thoughts. Engaged in guided discovery, explored patient's perspectives and helped expand them through socratic dialogue. Explored motivation for behavioral change.    Treatment Objective(s) Addressed:   The focus of this session was on rapport building, orienting the patient to therapy, processing feelings related to her separation from her , safety planning, anger management, assessing safety, and identifying treatment  goals.     Progress Towards Goals:   Patient reports stable symptoms. Patient is not making progress towards treatment goals as evidenced by verbal report from patient citing continued suicidal ideation, paranoia, and irritability.     Case Management:   RARS sent to [facility], patient remains on the wait list for IP mental health.      Plan and Clinical Summary and Substantiation of Recommendations:   Inpatient Mental Health: At the time of assessment, writer recommends inpatient psychiatry for stabilization and symptom reduction. Patient continues to express paranoia, suicidal ideation with plans, and can not contract for safety if she discharges. A lower level of care is not appropriate at this time. Patient continues to be voluntary, and is on the wait list for an inpatient bed.     Attending concurs with disposition: Yes         СВЕТЛАНА Barton            electronic

## 2025-04-13 ENCOUNTER — HEALTH MAINTENANCE LETTER (OUTPATIENT)
Age: 55
End: 2025-04-13

## 2025-07-06 ENCOUNTER — HEALTH MAINTENANCE LETTER (OUTPATIENT)
Age: 55
End: 2025-07-06

## 2025-07-27 ENCOUNTER — HEALTH MAINTENANCE LETTER (OUTPATIENT)
Age: 55
End: 2025-07-27